# Patient Record
Sex: MALE | Race: WHITE | NOT HISPANIC OR LATINO | Employment: OTHER | URBAN - METROPOLITAN AREA
[De-identification: names, ages, dates, MRNs, and addresses within clinical notes are randomized per-mention and may not be internally consistent; named-entity substitution may affect disease eponyms.]

---

## 2024-06-18 ENCOUNTER — PRE-ADMISSION TESTING (OUTPATIENT)
Dept: PREADMISSION TESTING | Facility: HOSPITAL | Age: 59
End: 2024-06-18
Payer: COMMERCIAL

## 2024-06-18 VITALS — WEIGHT: 240.74 LBS | HEIGHT: 72 IN | BODY MASS INDEX: 32.61 KG/M2

## 2024-06-18 LAB
DEPRECATED RDW RBC AUTO: 42 FL (ref 37–54)
ERYTHROCYTE [DISTWIDTH] IN BLOOD BY AUTOMATED COUNT: 12.2 % (ref 12.3–15.4)
HCT VFR BLD AUTO: 46.1 % (ref 37.5–51)
HGB BLD-MCNC: 16.3 G/DL (ref 13–17.7)
MCH RBC QN AUTO: 33.1 PG (ref 26.6–33)
MCHC RBC AUTO-ENTMCNC: 35.4 G/DL (ref 31.5–35.7)
MCV RBC AUTO: 93.5 FL (ref 79–97)
PLATELET # BLD AUTO: 154 10*3/MM3 (ref 140–450)
PMV BLD AUTO: 10.5 FL (ref 6–12)
POTASSIUM SERPL-SCNC: 4.2 MMOL/L (ref 3.5–5.2)
RBC # BLD AUTO: 4.93 10*6/MM3 (ref 4.14–5.8)
WBC NRBC COR # BLD AUTO: 5.49 10*3/MM3 (ref 3.4–10.8)

## 2024-06-18 PROCEDURE — 93010 ELECTROCARDIOGRAM REPORT: CPT | Performed by: INTERNAL MEDICINE

## 2024-06-18 PROCEDURE — 36415 COLL VENOUS BLD VENIPUNCTURE: CPT

## 2024-06-18 PROCEDURE — 93005 ELECTROCARDIOGRAM TRACING: CPT

## 2024-06-18 PROCEDURE — 85027 COMPLETE CBC AUTOMATED: CPT

## 2024-06-18 PROCEDURE — 84132 ASSAY OF SERUM POTASSIUM: CPT

## 2024-06-18 RX ORDER — TAMSULOSIN HYDROCHLORIDE 0.4 MG/1
1 CAPSULE ORAL DAILY
COMMUNITY
Start: 2024-06-03

## 2024-06-18 RX ORDER — ASPIRIN 81 MG/1
81 TABLET ORAL DAILY
Status: ON HOLD | COMMUNITY
End: 2024-06-20

## 2024-06-18 RX ORDER — LORATADINE 10 MG/1
10 TABLET ORAL DAILY
COMMUNITY

## 2024-06-18 RX ORDER — TESTOSTERONE 20.25 MG/1.25G
GEL TOPICAL
COMMUNITY
Start: 2024-05-04

## 2024-06-18 RX ORDER — ATORVASTATIN CALCIUM 20 MG/1
20 TABLET, FILM COATED ORAL DAILY
COMMUNITY

## 2024-06-18 RX ORDER — VALSARTAN AND HYDROCHLOROTHIAZIDE 160; 12.5 MG/1; MG/1
0.5 TABLET, FILM COATED ORAL NIGHTLY
COMMUNITY
Start: 2024-05-23

## 2024-06-18 RX ORDER — FAMOTIDINE 10 MG
20 TABLET ORAL DAILY
COMMUNITY

## 2024-06-18 RX ORDER — CELECOXIB 200 MG/1
200 CAPSULE ORAL DAILY
Status: ON HOLD | COMMUNITY
End: 2024-06-20

## 2024-06-18 RX ORDER — ESZOPICLONE 3 MG/1
3 TABLET, FILM COATED ORAL
COMMUNITY
Start: 2024-06-03

## 2024-06-18 NOTE — PAT
An arrival time for procedure was not provided during PAT visit. If patient had any questions or concerns about their arrival time, they were instructed to contact their surgeon/physician.  Additionally, if the patient referred to an arrival time that was acquired from their my chart account, patient was encouraged to verify that time with their surgeon/physician. Arrival times are NOT provided in Pre Admission Testing Department.    Patient instructed to drink 20 ounces of Gatorade or Gatorlyte (if diabetic) and it needs to be completed 1 hour (for Main OR patients) or 2 hours (scheduled  section & [facility] patients) before given arrival time for procedure (NO RED Gatorade and NO Gatorade Zero).    Patient verbalized understanding.    Patient to apply Chlorhexadine wipes  to surgical area (as instructed) the night before procedure and the AM of procedure. Wipes provided.    One-on-one Pre Admission Testing general education provided during PAT visit.  Copies of PAT general education handouts (Incentive Spirometry, Meds to Beds Program, Patient Belongings, Pre-op skin preparation instructions, Blood Glucose testing, Visitor policy, Surgery FAQ, Code H) distributed to patient. Encouraged patient/family to read PAT general education handouts thoroughly and notify PAT staff with any questions or concerns. Patient instructed to bring PAT pass and completed skin prep sheet (if applicable) on the day of procedure. Patient verbalized understanding of all information and priority content.     Clearance from  in chart.    Patient reported he finished a z-swati on .  RN notified Rebeca at Dr. Calix's office.

## 2024-06-19 ENCOUNTER — HOSPITAL ENCOUNTER (OUTPATIENT)
Facility: HOSPITAL | Age: 59
Discharge: HOME | End: 2024-06-20
Attending: ORTHOPAEDIC SURGERY | Admitting: ORTHOPAEDIC SURGERY
Payer: COMMERCIAL

## 2024-06-19 ENCOUNTER — ANESTHESIA EVENT (OUTPATIENT)
Dept: PERIOP | Facility: HOSPITAL | Age: 59
End: 2024-06-19
Payer: COMMERCIAL

## 2024-06-19 ENCOUNTER — ANESTHESIA (OUTPATIENT)
Dept: PERIOP | Facility: HOSPITAL | Age: 59
End: 2024-06-19
Payer: COMMERCIAL

## 2024-06-19 ENCOUNTER — APPOINTMENT (OUTPATIENT)
Dept: GENERAL RADIOLOGY | Facility: HOSPITAL | Age: 59
End: 2024-06-19
Payer: COMMERCIAL

## 2024-06-19 DIAGNOSIS — Z98.890 S/P LUMBAR LAMINECTOMY: Primary | ICD-10-CM

## 2024-06-19 DIAGNOSIS — M54.40 LOW BACK PAIN WITH SCIATICA, SCIATICA LATERALITY UNSPECIFIED, UNSPECIFIED BACK PAIN LATERALITY, UNSPECIFIED CHRONICITY: ICD-10-CM

## 2024-06-19 PROBLEM — I10 HYPERTENSION: Status: ACTIVE | Noted: 2024-06-19

## 2024-06-19 PROBLEM — E78.5 HYPERLIPIDEMIA: Status: ACTIVE | Noted: 2024-06-19

## 2024-06-19 PROBLEM — M54.9 BACK PAIN: Status: ACTIVE | Noted: 2024-06-19

## 2024-06-19 PROCEDURE — 25010000002 DEXAMETHASONE PER 1 MG: Performed by: NURSE ANESTHETIST, CERTIFIED REGISTERED

## 2024-06-19 PROCEDURE — 25010000002 MIDAZOLAM PER 1 MG: Performed by: NURSE ANESTHETIST, CERTIFIED REGISTERED

## 2024-06-19 PROCEDURE — 72020 X-RAY EXAM OF SPINE 1 VIEW: CPT

## 2024-06-19 PROCEDURE — G0378 HOSPITAL OBSERVATION PER HR: HCPCS

## 2024-06-19 PROCEDURE — 25010000002 ONDANSETRON PER 1 MG: Performed by: ORTHOPAEDIC SURGERY

## 2024-06-19 PROCEDURE — 25010000002 CEFAZOLIN PER 500 MG: Performed by: ORTHOPAEDIC SURGERY

## 2024-06-19 PROCEDURE — 25010000002 PROPOFOL 10 MG/ML EMULSION: Performed by: NURSE ANESTHETIST, CERTIFIED REGISTERED

## 2024-06-19 PROCEDURE — 97161 PT EVAL LOW COMPLEX 20 MIN: CPT

## 2024-06-19 PROCEDURE — 25010000002 FENTANYL CITRATE (PF) 100 MCG/2ML SOLUTION: Performed by: NURSE ANESTHETIST, CERTIFIED REGISTERED

## 2024-06-19 PROCEDURE — 97116 GAIT TRAINING THERAPY: CPT

## 2024-06-19 PROCEDURE — 25010000002 PHENYLEPHRINE 10 MG/ML SOLUTION 1 ML VIAL: Performed by: NURSE ANESTHETIST, CERTIFIED REGISTERED

## 2024-06-19 PROCEDURE — 25010000002 SODIUM CHLORIDE 0.9 % WITH KCL 20 MEQ 20-0.9 MEQ/L-% SOLUTION: Performed by: ORTHOPAEDIC SURGERY

## 2024-06-19 PROCEDURE — 97110 THERAPEUTIC EXERCISES: CPT

## 2024-06-19 PROCEDURE — 25010000002 SUGAMMADEX 500 MG/5ML SOLUTION: Performed by: NURSE ANESTHETIST, CERTIFIED REGISTERED

## 2024-06-19 PROCEDURE — C1713 ANCHOR/SCREW BN/BN,TIS/BN: HCPCS | Performed by: ORTHOPAEDIC SURGERY

## 2024-06-19 PROCEDURE — S0260 H&P FOR SURGERY: HCPCS | Performed by: PHYSICIAN ASSISTANT

## 2024-06-19 PROCEDURE — 25810000003 LACTATED RINGERS PER 1000 ML: Performed by: ANESTHESIOLOGY

## 2024-06-19 DEVICE — WAX BONE HEMO AESCULAP 2.5GM: Type: IMPLANTABLE DEVICE | Site: SPINE LUMBAR | Status: FUNCTIONAL

## 2024-06-19 DEVICE — FLOSEAL WITH RECOTHROM - 10ML.
Type: IMPLANTABLE DEVICE | Site: SPINE LUMBAR | Status: FUNCTIONAL
Brand: FLOSEAL HEMOSTATIC MATRIX

## 2024-06-19 DEVICE — HEMOST ABS SURGIFOAM SZ100 8X12 10MM: Type: IMPLANTABLE DEVICE | Site: SPINE LUMBAR | Status: FUNCTIONAL

## 2024-06-19 RX ORDER — HYDROMORPHONE HYDROCHLORIDE 1 MG/ML
0.5 INJECTION, SOLUTION INTRAMUSCULAR; INTRAVENOUS; SUBCUTANEOUS
Status: DISCONTINUED | OUTPATIENT
Start: 2024-06-19 | End: 2024-06-19 | Stop reason: HOSPADM

## 2024-06-19 RX ORDER — ATORVASTATIN CALCIUM 20 MG/1
20 TABLET, FILM COATED ORAL DAILY
Status: DISCONTINUED | OUTPATIENT
Start: 2024-06-19 | End: 2024-06-20 | Stop reason: HOSPADM

## 2024-06-19 RX ORDER — VALSARTAN 160 MG/1
160 TABLET ORAL
Status: DISCONTINUED | OUTPATIENT
Start: 2024-06-19 | End: 2024-06-20 | Stop reason: HOSPADM

## 2024-06-19 RX ORDER — FAMOTIDINE 20 MG/1
20 TABLET, FILM COATED ORAL DAILY
Status: DISCONTINUED | OUTPATIENT
Start: 2024-06-20 | End: 2024-06-20 | Stop reason: HOSPADM

## 2024-06-19 RX ORDER — ACETAMINOPHEN 160 MG/5ML
650 SOLUTION ORAL EVERY 8 HOURS
Status: DISCONTINUED | OUTPATIENT
Start: 2024-06-19 | End: 2024-06-20 | Stop reason: HOSPADM

## 2024-06-19 RX ORDER — SODIUM CHLORIDE 9 MG/ML
40 INJECTION, SOLUTION INTRAVENOUS AS NEEDED
Status: DISCONTINUED | OUTPATIENT
Start: 2024-06-19 | End: 2024-06-20 | Stop reason: HOSPADM

## 2024-06-19 RX ORDER — SODIUM CHLORIDE AND POTASSIUM CHLORIDE 150; 900 MG/100ML; MG/100ML
100 INJECTION, SOLUTION INTRAVENOUS CONTINUOUS
Status: DISCONTINUED | OUTPATIENT
Start: 2024-06-19 | End: 2024-06-20 | Stop reason: HOSPADM

## 2024-06-19 RX ORDER — AMOXICILLIN 250 MG
2 CAPSULE ORAL 2 TIMES DAILY PRN
Status: DISCONTINUED | OUTPATIENT
Start: 2024-06-19 | End: 2024-06-20 | Stop reason: HOSPADM

## 2024-06-19 RX ORDER — SODIUM CHLORIDE 0.9 % (FLUSH) 0.9 %
3 SYRINGE (ML) INJECTION EVERY 12 HOURS SCHEDULED
Status: DISCONTINUED | OUTPATIENT
Start: 2024-06-19 | End: 2024-06-19 | Stop reason: HOSPADM

## 2024-06-19 RX ORDER — MIDAZOLAM HYDROCHLORIDE 1 MG/ML
INJECTION INTRAMUSCULAR; INTRAVENOUS AS NEEDED
Status: DISCONTINUED | OUTPATIENT
Start: 2024-06-19 | End: 2024-06-19 | Stop reason: SURG

## 2024-06-19 RX ORDER — NALOXONE HCL 0.4 MG/ML
0.4 VIAL (ML) INJECTION
Status: DISCONTINUED | OUTPATIENT
Start: 2024-06-19 | End: 2024-06-20 | Stop reason: HOSPADM

## 2024-06-19 RX ORDER — SODIUM CHLORIDE 9 MG/ML
40 INJECTION, SOLUTION INTRAVENOUS AS NEEDED
Status: DISCONTINUED | OUTPATIENT
Start: 2024-06-19 | End: 2024-06-19 | Stop reason: HOSPADM

## 2024-06-19 RX ORDER — PREGABALIN 75 MG/1
75 CAPSULE ORAL ONCE
Status: COMPLETED | OUTPATIENT
Start: 2024-06-19 | End: 2024-06-19

## 2024-06-19 RX ORDER — PROMETHAZINE HYDROCHLORIDE 25 MG/1
25 TABLET ORAL ONCE AS NEEDED
Status: DISCONTINUED | OUTPATIENT
Start: 2024-06-19 | End: 2024-06-19 | Stop reason: HOSPADM

## 2024-06-19 RX ORDER — DROPERIDOL 2.5 MG/ML
0.62 INJECTION, SOLUTION INTRAMUSCULAR; INTRAVENOUS
Status: DISCONTINUED | OUTPATIENT
Start: 2024-06-19 | End: 2024-06-19 | Stop reason: HOSPADM

## 2024-06-19 RX ORDER — FAMOTIDINE 20 MG/1
20 TABLET, FILM COATED ORAL ONCE
Status: COMPLETED | OUTPATIENT
Start: 2024-06-19 | End: 2024-06-19

## 2024-06-19 RX ORDER — MEPERIDINE HYDROCHLORIDE 25 MG/ML
12.5 INJECTION INTRAMUSCULAR; INTRAVENOUS; SUBCUTANEOUS
Status: DISCONTINUED | OUTPATIENT
Start: 2024-06-19 | End: 2024-06-19 | Stop reason: HOSPADM

## 2024-06-19 RX ORDER — LABETALOL HYDROCHLORIDE 5 MG/ML
10 INJECTION, SOLUTION INTRAVENOUS EVERY 4 HOURS PRN
Status: DISCONTINUED | OUTPATIENT
Start: 2024-06-19 | End: 2024-06-20 | Stop reason: HOSPADM

## 2024-06-19 RX ORDER — IPRATROPIUM BROMIDE AND ALBUTEROL SULFATE 2.5; .5 MG/3ML; MG/3ML
3 SOLUTION RESPIRATORY (INHALATION) ONCE AS NEEDED
Status: DISCONTINUED | OUTPATIENT
Start: 2024-06-19 | End: 2024-06-19 | Stop reason: HOSPADM

## 2024-06-19 RX ORDER — SODIUM CHLORIDE, SODIUM LACTATE, POTASSIUM CHLORIDE, CALCIUM CHLORIDE 600; 310; 30; 20 MG/100ML; MG/100ML; MG/100ML; MG/100ML
9 INJECTION, SOLUTION INTRAVENOUS CONTINUOUS
Status: DISCONTINUED | OUTPATIENT
Start: 2024-06-19 | End: 2024-06-20 | Stop reason: HOSPADM

## 2024-06-19 RX ORDER — ACETAMINOPHEN 325 MG/1
650 TABLET ORAL EVERY 8 HOURS
Status: DISCONTINUED | OUTPATIENT
Start: 2024-06-19 | End: 2024-06-20 | Stop reason: HOSPADM

## 2024-06-19 RX ORDER — DROPERIDOL 2.5 MG/ML
0.62 INJECTION, SOLUTION INTRAMUSCULAR; INTRAVENOUS ONCE AS NEEDED
Status: DISCONTINUED | OUTPATIENT
Start: 2024-06-19 | End: 2024-06-19 | Stop reason: HOSPADM

## 2024-06-19 RX ORDER — SODIUM CHLORIDE 0.9 % (FLUSH) 0.9 %
10 SYRINGE (ML) INJECTION EVERY 12 HOURS SCHEDULED
Status: DISCONTINUED | OUTPATIENT
Start: 2024-06-19 | End: 2024-06-19 | Stop reason: HOSPADM

## 2024-06-19 RX ORDER — FENTANYL CITRATE 50 UG/ML
INJECTION, SOLUTION INTRAMUSCULAR; INTRAVENOUS AS NEEDED
Status: DISCONTINUED | OUTPATIENT
Start: 2024-06-19 | End: 2024-06-19 | Stop reason: SURG

## 2024-06-19 RX ORDER — EPHEDRINE SULFATE 50 MG/ML
INJECTION INTRAVENOUS AS NEEDED
Status: DISCONTINUED | OUTPATIENT
Start: 2024-06-19 | End: 2024-06-19 | Stop reason: SURG

## 2024-06-19 RX ORDER — SODIUM CHLORIDE 0.9 % (FLUSH) 0.9 %
3-10 SYRINGE (ML) INJECTION AS NEEDED
Status: DISCONTINUED | OUTPATIENT
Start: 2024-06-19 | End: 2024-06-20 | Stop reason: HOSPADM

## 2024-06-19 RX ORDER — MIDAZOLAM HYDROCHLORIDE 1 MG/ML
1 INJECTION INTRAMUSCULAR; INTRAVENOUS
Status: DISCONTINUED | OUTPATIENT
Start: 2024-06-19 | End: 2024-06-19 | Stop reason: HOSPADM

## 2024-06-19 RX ORDER — ACETAMINOPHEN 500 MG
1000 TABLET ORAL ONCE
Status: COMPLETED | OUTPATIENT
Start: 2024-06-19 | End: 2024-06-19

## 2024-06-19 RX ORDER — POLYETHYLENE GLYCOL 3350 17 G/17G
17 POWDER, FOR SOLUTION ORAL DAILY PRN
Status: DISCONTINUED | OUTPATIENT
Start: 2024-06-19 | End: 2024-06-20 | Stop reason: HOSPADM

## 2024-06-19 RX ORDER — ONDANSETRON 2 MG/ML
4 INJECTION INTRAMUSCULAR; INTRAVENOUS ONCE AS NEEDED
Status: DISCONTINUED | OUTPATIENT
Start: 2024-06-19 | End: 2024-06-19 | Stop reason: HOSPADM

## 2024-06-19 RX ORDER — LABETALOL HYDROCHLORIDE 5 MG/ML
5 INJECTION, SOLUTION INTRAVENOUS
Status: DISCONTINUED | OUTPATIENT
Start: 2024-06-19 | End: 2024-06-19 | Stop reason: HOSPADM

## 2024-06-19 RX ORDER — ONDANSETRON 2 MG/ML
4 INJECTION INTRAMUSCULAR; INTRAVENOUS EVERY 6 HOURS PRN
Status: DISCONTINUED | OUTPATIENT
Start: 2024-06-19 | End: 2024-06-20

## 2024-06-19 RX ORDER — SODIUM CHLORIDE 0.9 % (FLUSH) 0.9 %
3 SYRINGE (ML) INJECTION EVERY 12 HOURS SCHEDULED
Status: DISCONTINUED | OUTPATIENT
Start: 2024-06-19 | End: 2024-06-20 | Stop reason: HOSPADM

## 2024-06-19 RX ORDER — LIDOCAINE HYDROCHLORIDE 10 MG/ML
INJECTION, SOLUTION EPIDURAL; INFILTRATION; INTRACAUDAL; PERINEURAL AS NEEDED
Status: DISCONTINUED | OUTPATIENT
Start: 2024-06-19 | End: 2024-06-19 | Stop reason: SURG

## 2024-06-19 RX ORDER — DEXAMETHASONE SODIUM PHOSPHATE 4 MG/ML
INJECTION, SOLUTION INTRA-ARTICULAR; INTRALESIONAL; INTRAMUSCULAR; INTRAVENOUS; SOFT TISSUE AS NEEDED
Status: DISCONTINUED | OUTPATIENT
Start: 2024-06-19 | End: 2024-06-19 | Stop reason: SURG

## 2024-06-19 RX ORDER — NALOXONE HCL 0.4 MG/ML
0.4 VIAL (ML) INJECTION AS NEEDED
Status: DISCONTINUED | OUTPATIENT
Start: 2024-06-19 | End: 2024-06-19 | Stop reason: HOSPADM

## 2024-06-19 RX ORDER — OXYCODONE HCL 10 MG/1
10 TABLET, FILM COATED, EXTENDED RELEASE ORAL ONCE
Status: COMPLETED | OUTPATIENT
Start: 2024-06-19 | End: 2024-06-19

## 2024-06-19 RX ORDER — FENTANYL CITRATE 50 UG/ML
50 INJECTION, SOLUTION INTRAMUSCULAR; INTRAVENOUS
Status: DISCONTINUED | OUTPATIENT
Start: 2024-06-19 | End: 2024-06-19 | Stop reason: HOSPADM

## 2024-06-19 RX ORDER — FAMOTIDINE 10 MG/ML
20 INJECTION, SOLUTION INTRAVENOUS ONCE
Status: CANCELLED | OUTPATIENT
Start: 2024-06-19 | End: 2024-06-19

## 2024-06-19 RX ORDER — TAMSULOSIN HYDROCHLORIDE 0.4 MG/1
0.4 CAPSULE ORAL DAILY
Status: DISCONTINUED | OUTPATIENT
Start: 2024-06-19 | End: 2024-06-20 | Stop reason: HOSPADM

## 2024-06-19 RX ORDER — MORPHINE SULFATE 2 MG/ML
2 INJECTION, SOLUTION INTRAMUSCULAR; INTRAVENOUS
Status: DISCONTINUED | OUTPATIENT
Start: 2024-06-19 | End: 2024-06-20 | Stop reason: HOSPADM

## 2024-06-19 RX ORDER — PROMETHAZINE HYDROCHLORIDE 25 MG/1
25 SUPPOSITORY RECTAL ONCE AS NEEDED
Status: DISCONTINUED | OUTPATIENT
Start: 2024-06-19 | End: 2024-06-19 | Stop reason: HOSPADM

## 2024-06-19 RX ORDER — HYDROCODONE BITARTRATE AND ACETAMINOPHEN 7.5; 325 MG/1; MG/1
1 TABLET ORAL EVERY 4 HOURS PRN
Status: DISCONTINUED | OUTPATIENT
Start: 2024-06-19 | End: 2024-06-20 | Stop reason: HOSPADM

## 2024-06-19 RX ORDER — PROPOFOL 10 MG/ML
VIAL (ML) INTRAVENOUS AS NEEDED
Status: DISCONTINUED | OUTPATIENT
Start: 2024-06-19 | End: 2024-06-19 | Stop reason: SURG

## 2024-06-19 RX ORDER — SODIUM CHLORIDE 0.9 % (FLUSH) 0.9 %
10 SYRINGE (ML) INJECTION AS NEEDED
Status: DISCONTINUED | OUTPATIENT
Start: 2024-06-19 | End: 2024-06-19 | Stop reason: HOSPADM

## 2024-06-19 RX ORDER — ONDANSETRON 2 MG/ML
4 INJECTION INTRAMUSCULAR; INTRAVENOUS EVERY 6 HOURS PRN
Status: DISCONTINUED | OUTPATIENT
Start: 2024-06-19 | End: 2024-06-19 | Stop reason: SDUPTHER

## 2024-06-19 RX ORDER — SODIUM CHLORIDE 0.9 % (FLUSH) 0.9 %
3-10 SYRINGE (ML) INJECTION AS NEEDED
Status: DISCONTINUED | OUTPATIENT
Start: 2024-06-19 | End: 2024-06-19 | Stop reason: HOSPADM

## 2024-06-19 RX ORDER — HYDRALAZINE HYDROCHLORIDE 20 MG/ML
5 INJECTION INTRAMUSCULAR; INTRAVENOUS
Status: DISCONTINUED | OUTPATIENT
Start: 2024-06-19 | End: 2024-06-19 | Stop reason: HOSPADM

## 2024-06-19 RX ORDER — LIDOCAINE HYDROCHLORIDE 10 MG/ML
0.5 INJECTION, SOLUTION EPIDURAL; INFILTRATION; INTRACAUDAL; PERINEURAL ONCE AS NEEDED
Status: COMPLETED | OUTPATIENT
Start: 2024-06-19 | End: 2024-06-19

## 2024-06-19 RX ORDER — PHENYLEPHRINE HCL IN 0.9% NACL 1 MG/10 ML
SYRINGE (ML) INTRAVENOUS AS NEEDED
Status: DISCONTINUED | OUTPATIENT
Start: 2024-06-19 | End: 2024-06-19 | Stop reason: SURG

## 2024-06-19 RX ORDER — BISACODYL 5 MG/1
5 TABLET, DELAYED RELEASE ORAL DAILY PRN
Status: DISCONTINUED | OUTPATIENT
Start: 2024-06-19 | End: 2024-06-20 | Stop reason: HOSPADM

## 2024-06-19 RX ORDER — HYDROCODONE BITARTRATE AND ACETAMINOPHEN 5; 325 MG/1; MG/1
1 TABLET ORAL ONCE AS NEEDED
Status: DISCONTINUED | OUTPATIENT
Start: 2024-06-19 | End: 2024-06-19 | Stop reason: HOSPADM

## 2024-06-19 RX ORDER — MAGNESIUM HYDROXIDE 1200 MG/15ML
LIQUID ORAL AS NEEDED
Status: DISCONTINUED | OUTPATIENT
Start: 2024-06-19 | End: 2024-06-19 | Stop reason: HOSPADM

## 2024-06-19 RX ORDER — METHOCARBAMOL 500 MG/1
1000 TABLET, FILM COATED ORAL 4 TIMES DAILY PRN
Status: DISCONTINUED | OUTPATIENT
Start: 2024-06-19 | End: 2024-06-20 | Stop reason: HOSPADM

## 2024-06-19 RX ORDER — ACETAMINOPHEN 650 MG/1
650 SUPPOSITORY RECTAL EVERY 8 HOURS
Status: DISCONTINUED | OUTPATIENT
Start: 2024-06-19 | End: 2024-06-20 | Stop reason: HOSPADM

## 2024-06-19 RX ORDER — CYCLOBENZAPRINE HCL 10 MG
10 TABLET ORAL 3 TIMES DAILY PRN
Status: DISCONTINUED | OUTPATIENT
Start: 2024-06-19 | End: 2024-06-20 | Stop reason: HOSPADM

## 2024-06-19 RX ORDER — BUPIVACAINE HYDROCHLORIDE AND EPINEPHRINE 2.5; 5 MG/ML; UG/ML
INJECTION, SOLUTION EPIDURAL; INFILTRATION; INTRACAUDAL; PERINEURAL AS NEEDED
Status: DISCONTINUED | OUTPATIENT
Start: 2024-06-19 | End: 2024-06-19 | Stop reason: HOSPADM

## 2024-06-19 RX ORDER — ROCURONIUM BROMIDE 10 MG/ML
INJECTION, SOLUTION INTRAVENOUS AS NEEDED
Status: DISCONTINUED | OUTPATIENT
Start: 2024-06-19 | End: 2024-06-19 | Stop reason: SURG

## 2024-06-19 RX ORDER — MORPHINE SULFATE 2 MG/ML
1 INJECTION, SOLUTION INTRAMUSCULAR; INTRAVENOUS
Status: DISCONTINUED | OUTPATIENT
Start: 2024-06-19 | End: 2024-06-20 | Stop reason: HOSPADM

## 2024-06-19 RX ORDER — BISACODYL 10 MG
10 SUPPOSITORY, RECTAL RECTAL DAILY PRN
Status: DISCONTINUED | OUTPATIENT
Start: 2024-06-19 | End: 2024-06-20 | Stop reason: HOSPADM

## 2024-06-19 RX ADMIN — Medication 100 MCG: at 11:32

## 2024-06-19 RX ADMIN — ROCURONIUM BROMIDE 80 MG: 10 INJECTION INTRAVENOUS at 10:46

## 2024-06-19 RX ADMIN — SUGAMMADEX 300 MG: 100 INJECTION, SOLUTION INTRAVENOUS at 12:51

## 2024-06-19 RX ADMIN — MIDAZOLAM HYDROCHLORIDE 2 MG: 1 INJECTION, SOLUTION INTRAMUSCULAR; INTRAVENOUS at 10:35

## 2024-06-19 RX ADMIN — Medication 100 MCG: at 11:29

## 2024-06-19 RX ADMIN — EPHEDRINE SULFATE 5 MG: 50 INJECTION INTRAVENOUS at 11:46

## 2024-06-19 RX ADMIN — LIDOCAINE HYDROCHLORIDE 0.5 ML: 10 INJECTION, SOLUTION EPIDURAL; INFILTRATION; INTRACAUDAL; PERINEURAL at 08:18

## 2024-06-19 RX ADMIN — SODIUM CHLORIDE, POTASSIUM CHLORIDE, SODIUM LACTATE AND CALCIUM CHLORIDE 9 ML/HR: 600; 310; 30; 20 INJECTION, SOLUTION INTRAVENOUS at 08:18

## 2024-06-19 RX ADMIN — DEXAMETHASONE SODIUM PHOSPHATE 8 MG: 4 INJECTION INTRA-ARTICULAR; INTRALESIONAL; INTRAMUSCULAR; INTRAVENOUS; SOFT TISSUE at 11:20

## 2024-06-19 RX ADMIN — PROPOFOL 250 MG: 10 INJECTION, EMULSION INTRAVENOUS at 10:45

## 2024-06-19 RX ADMIN — Medication 100 MCG: at 11:46

## 2024-06-19 RX ADMIN — HYDROCODONE BITARTRATE AND ACETAMINOPHEN 1 TABLET: 7.5; 325 TABLET ORAL at 20:38

## 2024-06-19 RX ADMIN — ROCURONIUM BROMIDE 10 MG: 10 INJECTION INTRAVENOUS at 11:35

## 2024-06-19 RX ADMIN — OXYCODONE HYDROCHLORIDE 10 MG: 10 TABLET, FILM COATED, EXTENDED RELEASE ORAL at 08:31

## 2024-06-19 RX ADMIN — METHOCARBAMOL 1000 MG: 500 TABLET ORAL at 15:04

## 2024-06-19 RX ADMIN — PHENYLEPHRINE HYDROCHLORIDE 0.25 MCG/KG/MIN: 10 INJECTION INTRAVENOUS at 12:02

## 2024-06-19 RX ADMIN — Medication 100 MCG: at 11:44

## 2024-06-19 RX ADMIN — FAMOTIDINE 20 MG: 20 TABLET, FILM COATED ORAL at 08:31

## 2024-06-19 RX ADMIN — LIDOCAINE HYDROCHLORIDE 100 MG: 10 INJECTION, SOLUTION EPIDURAL; INFILTRATION; INTRACAUDAL; PERINEURAL at 10:44

## 2024-06-19 RX ADMIN — SODIUM CHLORIDE 2 G: 900 INJECTION INTRAVENOUS at 10:35

## 2024-06-19 RX ADMIN — Medication 100 MCG: at 11:50

## 2024-06-19 RX ADMIN — ACETAMINOPHEN 650 MG: 325 TABLET ORAL at 15:03

## 2024-06-19 RX ADMIN — ACETAMINOPHEN 1000 MG: 500 TABLET ORAL at 08:31

## 2024-06-19 RX ADMIN — FENTANYL CITRATE 50 MCG: 50 INJECTION, SOLUTION INTRAMUSCULAR; INTRAVENOUS at 13:21

## 2024-06-19 RX ADMIN — ONDANSETRON 4 MG: 2 INJECTION INTRAMUSCULAR; INTRAVENOUS at 16:51

## 2024-06-19 RX ADMIN — SODIUM CHLORIDE 2000 MG: 900 INJECTION INTRAVENOUS at 16:51

## 2024-06-19 RX ADMIN — Medication 150 MCG: at 11:57

## 2024-06-19 RX ADMIN — POTASSIUM CHLORIDE AND SODIUM CHLORIDE 100 ML/HR: 900; 150 INJECTION, SOLUTION INTRAVENOUS at 15:04

## 2024-06-19 RX ADMIN — VALSARTAN 80 MG: 160 TABLET, FILM COATED ORAL at 15:03

## 2024-06-19 RX ADMIN — FENTANYL CITRATE 100 MCG: 50 INJECTION, SOLUTION INTRAMUSCULAR; INTRAVENOUS at 10:42

## 2024-06-19 RX ADMIN — FENTANYL CITRATE 50 MCG: 50 INJECTION, SOLUTION INTRAMUSCULAR; INTRAVENOUS at 13:01

## 2024-06-19 RX ADMIN — Medication 3 ML: at 20:38

## 2024-06-19 RX ADMIN — PREGABALIN 75 MG: 75 CAPSULE ORAL at 08:31

## 2024-06-19 RX ADMIN — TAMSULOSIN HYDROCHLORIDE 0.4 MG: 0.4 CAPSULE ORAL at 15:03

## 2024-06-19 RX ADMIN — ROCURONIUM BROMIDE 10 MG: 10 INJECTION INTRAVENOUS at 11:49

## 2024-06-19 RX ADMIN — ATORVASTATIN CALCIUM 20 MG: 20 TABLET, FILM COATED ORAL at 15:03

## 2024-06-19 RX ADMIN — HYDROCODONE BITARTRATE AND ACETAMINOPHEN 1 TABLET: 7.5; 325 TABLET ORAL at 15:03

## 2024-06-19 NOTE — PLAN OF CARE
Goal Outcome Evaluation:              Outcome Evaluation: (P) Pt able to ambulate 350' with FWW and CGA. Pt with no radicular symptoms in BLE throughout session. Pt presents below baseline with deficits in strength, balance, activity tolerance, and pain. IPPT would be appropriate during admission to address functional goals. PT recommended DC to home with assist when medicaly appropriate.      Anticipated Discharge Disposition (PT): (P) home with assist

## 2024-06-19 NOTE — H&P
Patient Name: Bravo Rogers  MRN: [id number]  : 1965  DOS: 2024    Attending: Grant Calix MD    Primary Care Provider: Yoni Brunson MD      Chief complaint:  Back pain    Subjective   Patient is a pleasant 59 y.o. male presented for scheduled surgery by Dr. Calix.  He underwent lumbar laminectomy under general anesthesia.  He tolerated surgery well and was admitted for further medical management.  He has had worsening back pain over the last couple years.  The pain radiated down bilateral lower extremities, R>L.  He denies saddle anesthesia.    When seen postop he is doing well.  His pain is well-controlled.  He denies nausea, shortness of breath or chest pain.  No history of DVT or PE.    Allergies:  No Known Allergies    Meds:  Medications Prior to Admission   Medication Sig Dispense Refill Last Dose    aspirin 81 MG EC tablet Take 1 tablet by mouth Daily.   Past Month    atorvastatin (LIPITOR) 20 MG tablet Take 1 tablet by mouth Daily.   2024    celecoxib (CeleBREX) 200 MG capsule Take 1 capsule by mouth Daily.   2024    eszopiclone (LUNESTA) 3 MG tablet Take 1 tablet by mouth every night at bedtime.   2024    famotidine (PEPCID) 10 MG tablet Take 2 tablets by mouth Daily.   2024    loratadine (CLARITIN) 10 MG tablet Take 1 tablet by mouth Daily.   2024    tamsulosin (FLOMAX) 0.4 MG capsule 24 hr capsule Take 1 capsule by mouth Daily.   2024    Testosterone 1.62 % gel APPLY 1 PUMP TO EACH ARM EVERY DAY   2024    valsartan-hydrochlorothiazide (DIOVAN-HCT) 160-12.5 MG per tablet Take 0.5 tablets by mouth Every Night.   2024         History:   Past Medical History:   Diagnosis Date    Elevated cholesterol     GERD (gastroesophageal reflux disease)     Hypertension     Seasonal allergies     Spinal stenosis      Past Surgical History:   Procedure Laterality Date    COLONOSCOPY      CYSTOSCOPY      lithotripsy, stent for kidney stone    FINGER / TOE CROSS  "FLAP      VASECTOMY      WISDOM TOOTH EXTRACTION       History reviewed. No pertinent family history.  Social History     Tobacco Use    Smoking status: Never    Smokeless tobacco: Never   Vaping Use    Vaping status: Never Used   Substance Use Topics    Alcohol use: Never    Drug use: Never   He is  with 2 children.  He is a coalminer.    Review of Systems  Pertinent items are noted in HPI, all other systems reviewed and negative    Vital Signs  /77   Pulse 71   Temp 97.4 °F (36.3 °C) (Oral)   Resp 18   Ht 182.9 cm (72\")   Wt 109 kg (240 lb)   SpO2 93%   BMI 32.55 kg/m²     Physical Exam:    General Appearance:    Alert, cooperative, in no acute distress   Head:    Normocephalic, without obvious abnormality, atraumatic   Eyes:            Lids and lashes normal, conjunctivae and sclerae normal, no   icterus, no pallor, corneas clear,    Ears:    Ears appear intact with no abnormalities noted   Throat:   No oral lesions, no thrush, oral mucosa moist   Back: Surgical dressing CDI.  Hemovac present   Lungs:     Clear to auscultation,respirations regular, even and unlabored    Heart:    Regular rhythm and normal rate, normal S1 and S2   Abdomen:     Normal bowel sounds, no masses, no organomegaly, soft non-tender, non-distended, no guarding, no rebound  tenderness   Genitalia:    Deferred   Extremities:   Moves all extremities well, no edema, no cyanosis, no  redness   Pulses:   Pulses palpable and equal bilaterally   Skin:   No bleeding, bruising or rash   Neurologic:   Cranial nerves 2 - 12 grossly intact. Flexion and dorsiflexion intact bilateral feet.        I reviewed the patient's new clinical results.       Results from last 7 days   Lab Units 06/18/24  1355   WBC 10*3/mm3 5.49   HEMOGLOBIN g/dL 16.3   HEMATOCRIT % 46.1   PLATELETS 10*3/mm3 154     Results from last 7 days   Lab Units 06/18/24  1355   POTASSIUM mmol/L 4.2     No results found for: \"HGBA1C\"      Assessment and Plan:     S/P " lumbar laminectomy    Back pain    Hyperlipidemia    Hypertension      Plan  1. PT-ambulate  2. Pain control-prns   3. IS-encourage  4. DVT proph- [facility]  5. Bowel regimen  6. Resume home medications as appropriate  7. Monitor post-op labs  8. DC planning for home    HTN, Hyperlipidemia  - Continue home Diovan and statin  - Hold HCTZ for now  - Monitor BP   - Holding parameters for BP meds  - Labetalol PRN for SBP>170      Sofya Rodríguez, JAMEY  06/19/24  15:22 EDT

## 2024-06-19 NOTE — H&P
[facility] PREOPERATIVE HISTORY AND PHYSICAL       Chief complaint:  LOWER BACK PAIN    Subjective:    Patient is a 59 y.o.male presents with history of progressive worsening of chronic lower back pain.  He describes pain as a dull to sharp pain to lower back with occasional pain radiating down legs (R>L) and intermittent tingling of upper thighs/legs (R>L).  He denies any bowel or bladder complaints or saddle anesthesia. Imaging of lumbar spine showed lumbar spondylosis. Conservative measures have been tried for 3 months or longer, but have failed to adequately treat or improve the patient's symptoms. Pain is restricting the patient's daily activities as well as quality of life. The patient is here today for scheduled and consented LUMBAR LAMINECTOMY L3-4, L4-5 by Dr. Calix.       Review of Systems:  General ROS: negative for fever, chills, weakness, dizziness, headache, fatigue, weight changes  Cardiovascular ROS: no chest pain or dyspnea on exertion  Respiratory ROS: no cough, shortness of breath, or wheezing  GI ROS: no abdominal pain/discomfort, nausea, vomiting or diarrhea   ROS: no dysuria, hematuria or complaints  Skin ROS: no itching, rash or open wounds.        Allergies: No Known Allergies  Latex: no known allergy  Contrast Dye:  no known allergy      Home Meds    Medications Prior to Admission   Medication Sig Dispense Refill Last Dose    aspirin 81 MG EC tablet Take 1 tablet by mouth Daily.   Past Month    atorvastatin (LIPITOR) 20 MG tablet Take 1 tablet by mouth Daily.   6/18/2024    celecoxib (CeleBREX) 200 MG capsule Take 1 capsule by mouth Daily.   6/18/2024    eszopiclone (LUNESTA) 3 MG tablet Take 1 tablet by mouth every night at bedtime.   6/18/2024    famotidine (PEPCID) 10 MG tablet Take 2 tablets by mouth Daily.   6/18/2024    loratadine (CLARITIN) 10 MG tablet Take 1 tablet by mouth Daily.   6/18/2024    tamsulosin (FLOMAX) 0.4 MG capsule 24 hr capsule Take 1 capsule by  "mouth Daily.   6/18/2024    Testosterone 1.62 % gel APPLY 1 PUMP TO EACH ARM EVERY DAY   6/18/2024    valsartan-hydrochlorothiazide (DIOVAN-HCT) 160-12.5 MG per tablet Take 0.5 tablets by mouth Every Night.   6/18/2024     PMH:   Past Medical History:   Diagnosis Date    Elevated cholesterol     GERD (gastroesophageal reflux disease)     Hypertension     Seasonal allergies     Spinal stenosis      PSH:    Past Surgical History:   Procedure Laterality Date    COLONOSCOPY      CYSTOSCOPY      lithotripsy, stent for kidney stone    FINGER / TOE CROSS FLAP      VASECTOMY      WISDOM TOOTH EXTRACTION       Immunization History:   Immunization History   Administered Date(s) Administered    COVID-19 (PFIZER) Purple Cap Monovalent 02/26/2021, 03/19/2021, 10/29/2021    Hepatitis A 02/24/2019    flucelvax quad pfs =>4 YRS 02/24/2019       Social History:  Social History     Tobacco Use    Smoking status: Never    Smokeless tobacco: Never   Substance Use Topics    Alcohol use: Never           Physical Exam:/92 (BP Location: Right arm, Patient Position: Lying)   Pulse 72   Temp 98.1 °F (36.7 °C) (Temporal)   Resp 18   Ht 182.9 cm (72\")   Wt 109 kg (240 lb)   SpO2 98%   BMI 32.55 kg/m²       General Appearance:    Alert, cooperative, no distress, appears stated age   Head:    Normocephalic, without obvious abnormality, atraumatic   Lungs:     Clear to auscultation bilaterally, respirations unlabored    Heart: Regular rate and rhythm, S1 and S2 normal, no murmur, rub    or gallop    Abdomen:    Soft without tenderness  +bowel sounds   Breast Exam:    deferred   Genitalia:    deferred   Extremities:   Extremities normal, atraumatic, no cyanosis or edema   Skin:   Skin color, texture, turgor normal, no rashes or lesions   Neurologic:   Grossly intact     Results Review:   LABS:  Lab Results   Component Value Date    WBC 5.49 06/18/2024    HGB 16.3 06/18/2024    HCT 46.1 06/18/2024    MCV 93.5 06/18/2024     " 06/18/2024    K 4.2 06/18/2024       RADIOLOGY:  Imaging Results (Last 72 Hours)       ** No results found for the last 72 hours. **              Cancer Staging (if applicable):  Cancer Patient: __ yes __no __unknown; If yes, clinical stage T:__ N:__M:__, stage group    Impression:  LOWER BACK PAIN; LUMBAR SPONDYLOSIS      Plan:  LUMBAR LAMINECTOMY L3-4, L4-5       FRANCO Ponce 6/19/2024 09:00 EDT

## 2024-06-19 NOTE — PLAN OF CARE
Problem: Adult Inpatient Plan of Care  Goal: Plan of Care Review  Outcome: Ongoing, Progressing  Flowsheets (Taken 6/19/2024 1817)  Progress: no change  Plan of Care Reviewed With: patient  Goal: Patient-Specific Goal (Individualized)  Outcome: Ongoing, Progressing  Goal: Absence of Hospital-Acquired Illness or Injury  Outcome: Ongoing, Progressing  Intervention: Identify and Manage Fall Risk  Recent Flowsheet Documentation  Taken 6/19/2024 1800 by Rochelle Vasquez RN  Safety Promotion/Fall Prevention:   activity supervised   assistive device/personal items within reach   clutter free environment maintained   safety round/check completed   room organization consistent   toileting scheduled  Taken 6/19/2024 1600 by Rochelle Vasquez RN  Safety Promotion/Fall Prevention:   activity supervised   assistive device/personal items within reach   clutter free environment maintained   room organization consistent   safety round/check completed   toileting scheduled  Taken 6/19/2024 1446 by Rochelle Vasquez RN  Safety Promotion/Fall Prevention:   activity supervised   assistive device/personal items within reach   clutter free environment maintained   room organization consistent   safety round/check completed   toileting scheduled  Intervention: Prevent Skin Injury  Recent Flowsheet Documentation  Taken 6/19/2024 1800 by Rochelle Vasquez RN  Body Position: legs elevated  Taken 6/19/2024 1600 by Rochelle Vasquez RN  Body Position: sitting up in bed  Taken 6/19/2024 1446 by Rochelle Vasquez RN  Body Position: sitting up in bed  Intervention: Prevent and Manage VTE (Venous Thromboembolism) Risk  Recent Flowsheet Documentation  Taken 6/19/2024 1800 by Rochelle Vasquez RN  Activity Management: activity encouraged  VTE Prevention/Management:   bilateral   sequential compression devices on  Taken 6/19/2024 1600 by Rochelle Vasquez RN  Activity Management: activity encouraged  VTE Prevention/Management:   bilateral   sequential compression  devices on  Taken 6/19/2024 1446 by Rochelle Vasquez RN  Activity Management: activity encouraged  VTE Prevention/Management:   bilateral   sequential compression devices on  Intervention: Prevent Infection  Recent Flowsheet Documentation  Taken 6/19/2024 1800 by Rochelle Vasquez RN  Infection Prevention:   environmental surveillance performed   rest/sleep promoted  Taken 6/19/2024 1600 by Rochelle Vasquez RN  Infection Prevention:   environmental surveillance performed   rest/sleep promoted  Taken 6/19/2024 1446 by Rochelle Vasquez RN  Infection Prevention:   environmental surveillance performed   rest/sleep promoted  Goal: Optimal Comfort and Wellbeing  Outcome: Ongoing, Progressing  Intervention: Provide Person-Centered Care  Recent Flowsheet Documentation  Taken 6/19/2024 1600 by Rochelle Vasquez RN  Trust Relationship/Rapport: care explained  Taken 6/19/2024 1446 by Rochelle Vasquez RN  Trust Relationship/Rapport: care explained  Goal: Readiness for Transition of Care  Outcome: Ongoing, Progressing  Intervention: Mutually Develop Transition Plan  Recent Flowsheet Documentation  Taken 6/19/2024 1446 by Rochelle Vasquez RN  Transportation Anticipated: family or friend will provide  Transportation Concerns: none  Patient/Family Anticipated Services at Transition:   Patient/Family Anticipates Transition to: home with family     Problem: Hypertension Comorbidity  Goal: Blood Pressure in Desired Range  Outcome: Ongoing, Progressing  Intervention: Maintain Blood Pressure Management  Recent Flowsheet Documentation  Taken 6/19/2024 1800 by Rochelle Vasquez RN  Medication Review/Management: medications reviewed  Taken 6/19/2024 1600 by Rochelle Vasquez RN  Medication Review/Management: medications reviewed  Taken 6/19/2024 1446 by Rochelle Vasquez RN  Medication Review/Management: medications reviewed     Problem: Bleeding (Spinal Surgery)  Goal: Absence of Bleeding  Outcome: Ongoing, Progressing     Problem: Bowel  Motility Impaired (Spinal Surgery)  Goal: Effective Bowel Elimination  Outcome: Ongoing, Progressing     Problem: Fluid and Electrolyte Imbalance (Spinal Surgery)  Goal: Fluid and Electrolyte Balance  Outcome: Ongoing, Progressing     Problem: Functional Ability Impaired (Spinal Surgery)  Goal: Optimal Functional Ability  Outcome: Ongoing, Progressing  Intervention: Optimize Functional Status  Recent Flowsheet Documentation  Taken 6/19/2024 1800 by Rochelle Vasquez RN  Activity Management: activity encouraged  Positioning/Transfer Devices:   pillows   in use  Taken 6/19/2024 1600 by Rochelle Vasquez RN  Activity Management: activity encouraged  Positioning/Transfer Devices:   pillows   in use  Taken 6/19/2024 1446 by Rochelle Vasquez RN  Activity Management: activity encouraged  Positioning/Transfer Devices:   pillows   in use     Problem: Infection (Spinal Surgery)  Goal: Absence of Infection Signs and Symptoms  Outcome: Ongoing, Progressing  Intervention: Prevent or Manage Infection  Recent Flowsheet Documentation  Taken 6/19/2024 1800 by Rochelle Vasquez RN  Infection Prevention:   environmental surveillance performed   rest/sleep promoted  Taken 6/19/2024 1600 by Rochelle Vasquez RN  Infection Prevention:   environmental surveillance performed   rest/sleep promoted  Taken 6/19/2024 1446 by Rochelle Vasquez RN  Infection Prevention:   environmental surveillance performed   rest/sleep promoted     Problem: Neurologic Impairment (Spinal Surgery)  Goal: Optimal Neurologic Function  Outcome: Ongoing, Progressing  Intervention: Optimize Neurologic Function  Recent Flowsheet Documentation  Taken 6/19/2024 1800 by Rochelle Vasquez RN  Body Position: legs elevated  Taken 6/19/2024 1600 by Rochelle Vasquez RN  Body Position: sitting up in bed  Taken 6/19/2024 1446 by Rochelle Vasquez RN  Body Position: sitting up in bed     Problem: Ongoing Anesthesia Effects (Spinal Surgery)  Goal: Anesthesia/Sedation Recovery  Outcome: Ongoing,  Progressing  Intervention: Optimize Anesthesia Recovery  Recent Flowsheet Documentation  Taken 6/19/2024 1800 by Rochelle Vasquez RN  Safety Promotion/Fall Prevention:   activity supervised   assistive device/personal items within reach   clutter free environment maintained   safety round/check completed   room organization consistent   toileting scheduled  Taken 6/19/2024 1600 by Rochelle Vasquez RN  Safety Promotion/Fall Prevention:   activity supervised   assistive device/personal items within reach   clutter free environment maintained   room organization consistent   safety round/check completed   toileting scheduled  Administration (IS): self-administered  Taken 6/19/2024 1446 by Rochelle Vasquez RN  Safety Promotion/Fall Prevention:   activity supervised   assistive device/personal items within reach   clutter free environment maintained   room organization consistent   safety round/check completed   toileting scheduled  Administration (IS):   instruction provided, initial   proper technique demonstrated   self-administered     Problem: Pain (Spinal Surgery)  Goal: Acceptable Pain Control  Outcome: Ongoing, Progressing     Problem: Postoperative Nausea and Vomiting (Spinal Surgery)  Goal: Nausea and Vomiting Relief  Outcome: Ongoing, Progressing     Problem: Postoperative Urinary Retention (Spinal Surgery)  Goal: Effective Urinary Elimination  Outcome: Ongoing, Progressing     Problem: Respiratory Compromise (Spinal Surgery)  Goal: Effective Oxygenation and Ventilation  Outcome: Ongoing, Progressing  Intervention: Optimize Oxygenation and Ventilation  Recent Flowsheet Documentation  Taken 6/19/2024 1800 by Rochelle Vasquez RN  Head of Bed (HOB) Positioning: HOB at 20-30 degrees  Taken 6/19/2024 1600 by Rochelle Vasquez RN  Head of Bed (HOB) Positioning: HOB at 30-45 degrees  Taken 6/19/2024 1446 by Rochelle Vasquez RN  Head of Bed (HOB) Positioning: HOB at 20-30 degrees   Goal Outcome Evaluation:  Plan of Care  Reviewed With: patient        Progress: no change       Pt alert and oriented x4. RA. VSS. Minimal complaints of pain controlled with PRN pain medication. Ambulated with PT and sat up in chair. Feet became numb in chair, pt moved back to bed with improvement. Plan to /c home tomorrow

## 2024-06-19 NOTE — ANESTHESIA PREPROCEDURE EVALUATION
Anesthesia Evaluation     Patient summary reviewed and Nursing notes reviewed   NPO Solid Status: > 8 hours  NPO Liquid Status: > 8 hours           Airway   Mallampati: I  TM distance: >3 FB  Neck ROM: full  No difficulty expected  Dental - normal exam     Pulmonary - normal exam   Cardiovascular   Exercise tolerance: good (4-7 METS)    Rhythm: regular  Rate: normal        Neuro/Psych  GI/Hepatic/Renal/Endo      Musculoskeletal     Abdominal    Substance History      OB/GYN          Other                    Anesthesia Plan    ASA 2     general     intravenous induction     Anesthetic plan, risks, benefits, and alternatives have been provided, discussed and informed consent has been obtained with: patient.    CODE STATUS:

## 2024-06-19 NOTE — OP NOTE
PREOPERATIVE DIAGNOSIS: L3-L4 and L4-L5 lumbar spinal stenosis.       POSTOPERATIVE DIAGNOSIS:  L3-L4 and L4-L5 lumbar spinal stenosis.      PROCEDURE PERFORMED:  L3-L4 and L4-L5 decompressive laminectomy, partial medial facetectomy and foraminotomies to decompress neural elements.      SURGEON: Grant Calix MD     ASSISTANT: GEE AMADO (The assistant was required for patient positioning, surgical approach, neural decompression, and wound closure).      ANESTHESIA: General.     ESTIMATED BLOOD LOSS:  30 mL    DESCRIPTION OF PROCEDURE: The patient was given a preoperative dose of intravenous Ancef. He was given a general anesthetic. He was placed in the prone position on the Tra frame. The back was prepped and draped sterilely.    A midline incision was made over L3-L4-L5.  The fascia was split and the paraspinal musculature was elevated. I identified levels by intraoperative x-ray. I started at L4-L5.  Using Kerrisons and a darien a midline laminectomy was performed of L3.  There was a moderate central stenosis. Ligamentum flavum was excised.  Bilateral medial facetectomies were performed.  The majority of the facets were preserved.  Bilateral foraminotomies were performed.  The neural elements at L4-L5 appeared decompressed.      I moved cephalad to L3-L4.  A similar decompression was performed.  A midline laminectomy was performed of L3 using Kerrisons and the darien.  The ligamentum flavum was excised.  There was a moderate central stenosis.  There was a moderately severe lateral recess stenosis.  Bilateral medial facetectomies were performed.  The majority of the facets were preserved.   Foraminotomies were performed.   The neural elements at L3-L4 appeared decompressed.      The dural sac was widely expanded at this point.  Using the ball tipped probe there was no longer any compression of the dural sac or nerve roots.    Cut bony surfaces were bone-waxed.  Floseal and Gelfoam were used to cover the  exposed dura.  A deep Hemovac drain was placed.        The wound was closed in layers using Vicryl. A sterile dressing was applied. Patient was awakened and transported to the recovery room in stable condition.

## 2024-06-19 NOTE — THERAPY EVALUATION
Patient Name: Bravo Rogers  : 1965    MRN: [id number]                              Today's Date: 2024       Admit Date: 2024    Visit Dx: No diagnosis found.  Patient Active Problem List   Diagnosis    Back pain    S/P lumbar laminectomy    Hyperlipidemia    Hypertension     Past Medical History:   Diagnosis Date    Elevated cholesterol     GERD (gastroesophageal reflux disease)     Hypertension     Seasonal allergies     Spinal stenosis      Past Surgical History:   Procedure Laterality Date    COLONOSCOPY      CYSTOSCOPY      lithotripsy, stent for kidney stone    FINGER / TOE CROSS FLAP      VASECTOMY      WISDOM TOOTH EXTRACTION        General Information       Row Name 24 1629          Physical Therapy Time and Intention    Document Type evaluation (P)   -     Mode of Treatment individual therapy;physical therapy (P)   -       Row Name 24 1629          General Information    Patient Profile Reviewed yes (P)   -     Prior Level of Function independent:;all household mobility;community mobility;ADL's (P)   -     Existing Precautions/Restrictions fall;spinal (P)   1 Hemovac drain  -     Barriers to Rehab none identified (P)   -       Row Name 24 1629          Living Environment    People in Home spouse (P)   -       Row Name 24 1629          Home Main Entrance    Number of Stairs, Main Entrance two;other (see comments) (P)   2 steps with a platform between  -     Stair Railings, Main Entrance none (P)   -       Row Name 24 1629          Stairs Within Home, Primary    Number of Stairs, Within Home, Primary none (P)   -       Row Name 24 1629          Cognition    Orientation Status (Cognition) oriented x 4 (P)   -       Row Name 24 1629          Safety Issues, Functional Mobility    Safety Issues Affecting Function (Mobility) awareness of need for assistance;insight into deficits/self-awareness;safety precaution awareness;safety  precautions follow-through/compliance (P)   -HM     Impairments Affecting Function (Mobility) balance;endurance/activity tolerance;pain;strength (P)   -     Comment, Safety Issues/Impairments (Mobility) Aware and following commands (P)   -HM               User Key  (r) = Recorded By, (t) = Taken By, (c) = Cosigned By      Initials Name Provider Type     Patsy Parker, PT Student PT Student                   Mobility       Row Name 06/19/24 1631          Bed Mobility    Bed Mobility supine-sit (P)   -HM     Supine-Sit L[place] (Bed Mobility) contact guard;verbal cues (P)   -     Assistive Device (Bed Mobility) bed rails (P)   -HM     Comment, (Bed Mobility) Pt showed good technique with logrolling with increased time needed to complete. Pt with mild complaints of dizziness upon sitting. BP check and was 113/77. Dizziness subsided with increased time sitting. (P)   -       Row Name 06/19/24 1631          Transfers    Comment, (Transfers) Pt educated on spinal precautions with VC for sequencing and hand placement. (P)   -       Row Name 06/19/24 1631          Sit-Stand Transfer    Sit-Stand Lyman (Transfers) contact guard;verbal cues (P)   -     Assistive Device (Sit-Stand Transfers) walker, front-wheeled (P)   -HM     Comment, (Sit-Stand Transfer) Pt with VC for pushing from bed and reaching back for chair upon sitting and standing. Pt with no complaints of dizziness upon standing. (P)   -       Row Name 06/19/24 1631          Gait/Stairs (Locomotion)    [place] Level (Gait) contact guard;1 person to manage equipment;verbal cues (P)   -     Assistive Device (Gait) walker, front-wheeled (P)   -HM     Distance in Feet (Gait) 350 (P)   -HM     Deviations/Abnormal Patterns (Gait) bilateral deviations;base of support, narrow;maryam decreased;gait speed decreased;stride length decreased (P)   -HM     Bilateral Gait Deviations forward flexed posture;heel strike decreased (P)   -HM      [place] Level (Stairs) not tested (P)   -     Comment, (Gait/Stairs) Pt able to ambulate 350' with CGA and FW. Pt required VC for standing upright and looking ahead. Pt able to walk with a step through gait pattern with further ambulation limited by activity tolerance. No LOB or knee buckling this session (P)   -               User Key  (r) = Recorded By, (t) = Taken By, (c) = Cosigned By      Initials Name Provider Type     Patsy Parker, PT Student PT Student                   Obj/Interventions       Row Name 06/19/24 1637          Range of Motion Comprehensive    General Range of Motion bilateral lower extremity ROM WNL (P)   -       Row Name 06/19/24 1637          Strength Comprehensive (MMT)    General Manual Muscle Testing (MMT) Assessment lower extremity strength deficits identified (P)   -     Comment, General Manual Muscle Testing (MMT) Assessment BLE grossly 4/5 (P)   -       Row Name 06/19/24 1637          Motor Skills    Therapeutic Exercise hip;knee;ankle;other (see comments) (P)   shoulder flexion stretch, BKFO, and abdominal hold x 10 reps  -       Row Name 06/19/24 1637          Hip (Therapeutic Exercise)    Hip (Therapeutic Exercise) isometric exercises;strengthening exercise (P)   -     Hip Isometrics (Therapeutic Exercise) bilateral;gluteal sets;3 second hold;10 repetitions (P)   -     Hip Strengthening (Therapeutic Exercise) bilateral;heel slides;10 repetitions (P)   -       Row Name 06/19/24 1637          Knee (Therapeutic Exercise)    Knee (Therapeutic Exercise) isometric exercises (P)   -     Knee Isometrics (Therapeutic Exercise) bilateral;quad sets;3 second hold;10 repetitions (P)   -       Row Name 06/19/24 1637          Ankle (Therapeutic Exercise)    Ankle (Therapeutic Exercise) AROM (active range of motion) (P)   -     Ankle AROM (Therapeutic Exercise) bilateral;dorsiflexion;plantarflexion;10 repetitions (P)   -       Row Name 06/19/24 1637           Balance    Balance Assessment sitting static balance;sitting dynamic balance;sit to stand dynamic balance;standing static balance;standing dynamic balance (P)   -     Static Sitting Balance standby assist (P)   -HM     Dynamic Sitting Balance contact guard (P)   -     Position, Sitting Balance unsupported;sitting in chair;sitting edge of bed (P)   -HM     Sit to Stand Dynamic Balance contact guard;verbal cues (P)   -HM     Static Standing Balance contact guard (P)   -     Dynamic Standing Balance contact guard;1 person to manage equipment;verbal cues (P)   -     Position/Device Used, Standing Balance supported;walker, front-wheeled (P)   -HM     Balance Interventions sitting;standing;sit to stand;occupation based/functional task (P)   -     Comment, Balance Pt with good static sitting and standing balance with mild unsteadiness during gait needed FWW for increased support. No LOB this session. (P)   -       Row Name 06/19/24 1638          Sensory Assessment (Somatosensory)    Sensory Assessment (Somatosensory) LE sensation intact;other (see comments) (P)   Pt with no radicular symptoms in BLE throughout session  -               User Key  (r) = Recorded By, (t) = Taken By, (c) = Cosigned By      Initials Name Provider Type     Patsy Parker, PT Student PT Student                   Goals/Plan       Row Name 06/19/24 1650          Bed Mobility Goal 1 (PT)    Activity/Assistive Device (Bed Mobility Goal 1, PT) sit to supine/supine to sit (P)   -     [place] Level/Cues Needed (Bed Mobility Goal 1, PT) independent (P)   -HM     Time Frame (Bed Mobility Goal 1, PT) short term goal (STG);3 days (P)   -     Progress/Outcomes (Bed Mobility Goal 1, PT) new goal (P)   -       Row Name 06/19/24 1650          Transfer Goal 1 (PT)    Activity/Assistive Device (Transfer Goal 1, PT) sit-to-stand/stand-to-sit;bed-to-chair/chair-to-bed (P)   -HM     [place] Level/Cues Needed (Transfer Goal 1, PT)  modified independence (P)   -HM     Time Frame (Transfer Goal 1, PT) long term goal (LTG);10 days (P)   -HM     Progress/Outcome (Transfer Goal 1, PT) new goal (P)   -       Row Name 06/19/24 1650          Gait Training Goal 1 (PT)    Activity/Assistive Device (Gait Training Goal 1, PT) gait (walking locomotion);assistive device use;walker, platform (P)   -HM     [place] Level (Gait Training Goal 1, PT) standby assist (P)   -HM     Distance (Gait Training Goal 1, PT) 500' (P)   -HM     Time Frame (Gait Training Goal 1, PT) long term goal (LTG);10 days (P)   -HM     Progress/Outcome (Gait Training Goal 1, PT) new goal (P)   -       Row Name 06/19/24 1650          Stairs Goal 1 (PT)    Activity/Assistive Device (Stairs Goal 1, PT) ascending stairs;descending stairs;assistive device use;walker, rolling (P)   -HM     [place] Level/Cues Needed (Stairs Goal 1, PT) standby assist (P)   -HM     Number of Stairs (Stairs Goal 1, PT) 2 (P)   -HM     Time Frame (Stairs Goal 1, PT) long term goal (LTG);10 days (P)   -HM     Progress/Outcome (Stairs Goal 1, PT) new goal (P)   -       Row Name 06/19/24 1650          Patient Education Goal (PT)    Activity (Patient Education Goal, PT) Able to verbalize 3/3 spinal precautions and demonstrate good logrolling technique. (P)   -HM     Time Frame (Patient Education Goal, PT) long term goal (LTG);10 days (P)   -HM     Progress/Outcome (Patient Education Goal, PT) new goal (P)   -       Row Name 06/19/24 8310          Therapy Assessment/Plan (PT)    Planned Therapy Interventions (PT) balance training;bed mobility training;gait training;home exercise program;motor coordination training;stretching;strengthening;stair training;ROM (range of motion);postural re-education;patient/family education;neuromuscular re-education;transfer training (P)   -               User Key  (r) = Recorded By, (t) = Taken By, (c) = Cosigned By      Initials Name Provider Type    NACHO Parker  Patsy FELIPE, PT Student PT Student                   Clinical Impression       Row Name 06/19/24 1645          Pain    Pretreatment Pain Rating 7/10 (P)   -HM     Posttreatment Pain Rating 3/10 (P)   -     Pain Location lower (P)   -     Pain Location - back (P)   -     Pre/Posttreatment Pain Comment Pt reported increased comfort in recliner compared to bed. Pt reported decreased pain following ambulation (P)   -     Pain Intervention(s) Cold applied;Repositioned;Ambulation/increased activity;Elevated (P)   -       Row Name 06/19/24 1643          Plan of Care Review    Outcome Evaluation Pt able to ambulate 350' with FWW and CGA. Pt with no radicular symptoms in BLE throughout session. Pt presents below baseline with deficits in strength, balance, activity tolerance, and pain. IPPT would be appropriate during admission to address functional goals. PT recommended DC to home with assist when medicaly appropriate. (P)   -       Row Name 06/19/24 1643          Therapy Assessment/Plan (PT)    Patient/Family Therapy Goals Statement (PT) Get back to [email or web address] (P)   -     Rehab Potential (PT) good, to achieve stated therapy goals (P)   -     Criteria for Skilled Interventions Met (PT) yes;meets criteria;skilled treatment is necessary (P)   -     Therapy Frequency (PT) daily (P)   -     Predicted Duration of Therapy Intervention (PT) 2 days (P)   -       Row Name 06/19/24 1643          Vital Signs    Pre Systolic BP Rehab 123 (P)   -HM     Pre Treatment Diastolic BP 77 (P)   -     Intra Systolic BP Rehab 113 (P)   -HM     Intra Treatment Diastolic BP 77 (P)   -HM     Pretreatment Heart Rate (beats/min) 71 (P)   -HM     Pre SpO2 (%) 93 (P)   -     O2 Delivery Pre Treatment room air (P)   -HM     Pre Patient Position Supine (P)   -HM     Intra Patient Position Sitting (P)   -       Row Name 06/19/24 1983          Positioning and Restraints    Pre-Treatment Position in bed (P)   -     Post Treatment  Position chair (P)   -HM     In Chair reclined;call light within reach;encouraged to call for assist;exit alarm on;with family/caregiver;waffle cushion;compression device;legs elevated (P)   -               User Key  (r) = Recorded By, (t) = Taken By, (c) = Cosigned By      Initials Name Provider Type     Patsy Parker, PT Student PT Student                   Outcome Measures       Row Name 06/19/24 1654 06/19/24 1446       How much help from another person do you currently need...    Turning from your back to your side while in flat bed without using bedrails? 4 (P)   -HM 4  -GS    Moving from lying on back to sitting on the side of a flat bed without bedrails? 3 (P)   -HM 4  -GS    Moving to and from a bed to a chair (including a wheelchair)? 3 (P)   - 3  -GS    Standing up from a chair using your arms (e.g., wheelchair, bedside chair)? 3 (P)   - 3  -GS    Climbing 3-5 steps with a railing? 3 (P)   - 2  -GS    To walk in hospital room? 3 (P)   - 3  -GS    AM-PAC 6 Clicks Score (PT) 19 (P)   - 19  -GS    Highest Level of Mobility Goal 6 --> Walk 10 steps or more (P)   - 6 --> Walk 10 steps or more  -GS      Row Name 06/19/24 1654          Functional Assessment    Outcome Measure Options AM-PAC 6 Clicks Basic Mobility (PT) (P)   -               User Key  (r) = Recorded By, (t) = Taken By, (c) = Cosigned By      Initials Name Provider Type     Rochelle Vasquez RN Registered Nurse     Patsy Parker, PT Student PT Student                                 Physical Therapy Education       Title: PT OT SLP Therapies (Done)       Topic: Physical Therapy (Done)       Point: Mobility training (Done)       Learning Progress Summary             Patient Acceptance, E,H,TB, VU by  at 6/19/2024 1654    Comment: Pt educated on spinal precautions and HEP   Family Acceptance, E,H,TB, VU by  at 6/19/2024 1654    Comment: Pt educated on spinal precautions and HEP                         Point: Home  exercise program (Done)       Learning Progress Summary             Patient Acceptance, E,H,TB, VU by  at 6/19/2024 1654    Comment: Pt educated on spinal precautions and HEP   Family Acceptance, E,H,TB, VU by  at 6/19/2024 1654    Comment: Pt educated on spinal precautions and HEP                         Point: Body mechanics (Done)       Learning Progress Summary             Patient Acceptance, E,H,TB, VU by  at 6/19/2024 1654    Comment: Pt educated on spinal precautions and HEP   Family Acceptance, E,H,TB, VU by  at 6/19/2024 1654    Comment: Pt educated on spinal precautions and HEP                         Point: Precautions (Done)       Learning Progress Summary             Patient Acceptance, E,H,TB, VU by  at 6/19/2024 1654    Comment: Pt educated on spinal precautions and HEP   Family Acceptance, E,H,TB, VU by  at 6/19/2024 1654    Comment: Pt educated on spinal precautions and HEP                                         User Key       Initials Effective Dates Name Provider Type Discipline     05/07/24 -  Patsy Parker, PT Student PT Student PT                  PT Recommendation and Plan  Planned Therapy Interventions (PT): (P) balance training, bed mobility training, gait training, home exercise program, motor coordination training, stretching, strengthening, stair training, ROM (range of motion), postural re-education, patient/family education, neuromuscular re-education, transfer training  Outcome Evaluation: (P) Pt able to ambulate 350' with FWW and CGA. Pt with no radicular symptoms in BLE throughout session. Pt presents below baseline with deficits in strength, balance, activity tolerance, and pain. IPPT would be appropriate during admission to address functional goals. PT recommended DC to home with assist when medicaly appropriate.     Time Calculation:   PT Evaluation Complexity  History, PT Evaluation Complexity: (P) 1-2 personal factors and/or comorbidities  Examination of Body  Systems (PT Eval Complexity): (P) total of 3 or more elements  Clinical Presentation (PT Evaluation Complexity): (P) stable  Clinical Decision Making (PT Evaluation Complexity): (P) low complexity  Overall Complexity (PT Evaluation Complexity): (P) low complexity     PT Charges       Row Name 06/19/24 1548             Time Calculation    Start Time 1548 (P)   -HM      PT Received On 06/19/24 (P)   -HM      PT Goal Re-Cert Due Date 06/29/24 (P)   -HM         Timed Charges    19562 - PT Therapeutic Exercise Minutes 15 (P)   -      75108 - Gait Training Minutes  10 (P)   -HM         Untimed Charges    PT Eval/Re-eval Minutes 37 (P)   -HM         Total Minutes    Timed Charges Total Minutes 25 (P)   -HM      Untimed Charges Total Minutes 37 (P)   -HM       Total Minutes 62 (P)   -                User Key  (r) = Recorded By, (t) = Taken By, (c) = Cosigned By      Initials Name Provider Type     Patsy Parker, PT Student PT Student                  Therapy Charges for Today       Code Description Service Date Service Provider Modifiers Qty    24411785681 HC PT THER PROC EA 15 MIN 6/19/2024 Patsy Parker, PT Student GP 1    31593547965 HC GAIT TRAINING EA 15 MIN 6/19/2024 Patsy Parker, PT Student GP 1    40383262765 HC PT EVAL LOW COMPLEXITY 3 6/19/2024 Patsy Parker, PT Student GP 1            PT G-Codes  Outcome Measure Options: (P) AM-PAC 6 Clicks Basic Mobility (PT)  AM-PAC 6 Clicks Score (PT): (P) 19  PT Discharge Summary  Anticipated Discharge Disposition (PT): (P) home with assist    Patsy Parker PT Student  6/19/2024

## 2024-06-19 NOTE — OP NOTE
Dictation on: 06/19/2024 12:51 PM by: NAM HAYWARD [[id number]]      exposed dura.  A deep Hemovac drain was placed.        The wound was closed in layers using Vicryl. A sterile dressing was applied. Patient was awakened and transported to the recovery room in stable condition.

## 2024-06-19 NOTE — ANESTHESIA PROCEDURE NOTES
Airway  Urgency: elective    Date/Time: 6/19/2024 10:48 AM  Airway not difficult    General Information and Staff    Patient location during procedure: OR    Indications and Patient Condition  Indications for airway management: airway protection    Preoxygenated: yes  MILS not maintained throughout  Mask difficulty assessment: 2 - vent by mask + OA or adjuvant +/- NMBA    Final Airway Details  Final airway type: endotracheal airway      Successful airway: ETT  Cuffed: yes   Successful intubation technique: direct laryngoscopy  Facilitating devices/methods: intubating stylet  Endotracheal tube insertion site: oral  Blade: Gómez  Blade size: 2  ETT size (mm): 7.5  Cormack-Lehane Classification: grade I - full view of glottis  Placement verified by: chest auscultation and capnometry   Inital cuff pressure (cm H2O): 9  Measured from: lips  ETT/EBT  to lips (cm): 22  Number of attempts at approach: 1  Assessment: lips, teeth, and gum same as pre-op and atraumatic intubation    Additional Comments  Negative epigastric sounds, Breath sound equal bilaterally with symmetric chest rise and fall

## 2024-06-20 VITALS
HEIGHT: 72 IN | OXYGEN SATURATION: 93 % | WEIGHT: 240 LBS | RESPIRATION RATE: 18 BRPM | SYSTOLIC BLOOD PRESSURE: 111 MMHG | HEART RATE: 70 BPM | DIASTOLIC BLOOD PRESSURE: 62 MMHG | BODY MASS INDEX: 32.51 KG/M2 | TEMPERATURE: 97.9 F

## 2024-06-20 LAB
ANION GAP SERPL CALCULATED.3IONS-SCNC: 10 MMOL/L (ref 5–15)
BUN SERPL-MCNC: 18 MG/DL (ref 8–23)
BUN/CREAT SERPL: 16.2 (ref 7–25)
CALCIUM SPEC-SCNC: 8.8 MG/DL (ref 8.6–10.5)
CHLORIDE SERPL-SCNC: 104 MMOL/L (ref 98–107)
CO2 SERPL-SCNC: 25 MMOL/L (ref 22–29)
CREAT SERPL-MCNC: 1.11 MG/DL (ref 0.76–1.27)
DEPRECATED RDW RBC AUTO: 40 FL (ref 37–54)
EGFRCR SERPLBLD CKD-EPI 2021: 75.5 ML/MIN/1.73
ERYTHROCYTE [DISTWIDTH] IN BLOOD BY AUTOMATED COUNT: 12.1 % (ref 12.3–15.4)
GLUCOSE SERPL-MCNC: 145 MG/DL (ref 65–99)
HCT VFR BLD AUTO: 38.4 % (ref 37.5–51)
HGB BLD-MCNC: 13.5 G/DL (ref 13–17.7)
MCH RBC QN AUTO: 31.9 PG (ref 26.6–33)
MCHC RBC AUTO-ENTMCNC: 35.2 G/DL (ref 31.5–35.7)
MCV RBC AUTO: 90.8 FL (ref 79–97)
PLATELET # BLD AUTO: 160 10*3/MM3 (ref 140–450)
PMV BLD AUTO: 10.4 FL (ref 6–12)
POTASSIUM SERPL-SCNC: 4.7 MMOL/L (ref 3.5–5.2)
QT INTERVAL: 406 MS
QTC INTERVAL: 438 MS
RBC # BLD AUTO: 4.23 10*6/MM3 (ref 4.14–5.8)
SODIUM SERPL-SCNC: 139 MMOL/L (ref 136–145)
WBC NRBC COR # BLD AUTO: 13.03 10*3/MM3 (ref 3.4–10.8)

## 2024-06-20 PROCEDURE — 63710000001 ONDANSETRON ODT 4 MG TABLET DISPERSIBLE: Performed by: INTERNAL MEDICINE

## 2024-06-20 PROCEDURE — 25010000002 CEFAZOLIN PER 500 MG: Performed by: ORTHOPAEDIC SURGERY

## 2024-06-20 PROCEDURE — 97165 OT EVAL LOW COMPLEX 30 MIN: CPT

## 2024-06-20 PROCEDURE — 80048 BASIC METABOLIC PNL TOTAL CA: CPT | Performed by: NURSE PRACTITIONER

## 2024-06-20 PROCEDURE — 97535 SELF CARE MNGMENT TRAINING: CPT

## 2024-06-20 PROCEDURE — 85027 COMPLETE CBC AUTOMATED: CPT | Performed by: NURSE PRACTITIONER

## 2024-06-20 PROCEDURE — 97116 GAIT TRAINING THERAPY: CPT

## 2024-06-20 RX ORDER — CELECOXIB 200 MG/1
200 CAPSULE ORAL DAILY
Start: 2024-06-22

## 2024-06-20 RX ORDER — HYDROCODONE BITARTRATE AND ACETAMINOPHEN 7.5; 325 MG/1; MG/1
1 TABLET ORAL EVERY 4 HOURS PRN
Qty: 42 TABLET | Refills: 0 | Status: SHIPPED | OUTPATIENT
Start: 2024-06-20

## 2024-06-20 RX ORDER — ONDANSETRON 4 MG/1
4 TABLET, ORALLY DISINTEGRATING ORAL EVERY 6 HOURS PRN
Status: DISCONTINUED | OUTPATIENT
Start: 2024-06-20 | End: 2024-06-20 | Stop reason: HOSPADM

## 2024-06-20 RX ORDER — POLYETHYLENE GLYCOL 3350 17 G/17G
17 POWDER, FOR SOLUTION ORAL DAILY
Qty: 238 G | Refills: 0 | Status: SHIPPED | OUTPATIENT
Start: 2024-06-20

## 2024-06-20 RX ORDER — ASPIRIN 81 MG/1
81 TABLET ORAL DAILY
Start: 2024-06-21

## 2024-06-20 RX ORDER — ONDANSETRON 4 MG/1
4 TABLET, ORALLY DISINTEGRATING ORAL EVERY 8 HOURS PRN
Qty: 15 TABLET | Refills: 0 | Status: SHIPPED | OUTPATIENT
Start: 2024-06-20

## 2024-06-20 RX ADMIN — SODIUM CHLORIDE 2000 MG: 900 INJECTION INTRAVENOUS at 02:06

## 2024-06-20 RX ADMIN — HYDROCODONE BITARTRATE AND ACETAMINOPHEN 1 TABLET: 7.5; 325 TABLET ORAL at 07:01

## 2024-06-20 RX ADMIN — HYDROCODONE BITARTRATE AND ACETAMINOPHEN 1 TABLET: 7.5; 325 TABLET ORAL at 00:24

## 2024-06-20 RX ADMIN — HYDROCODONE BITARTRATE AND ACETAMINOPHEN 1 TABLET: 7.5; 325 TABLET ORAL at 13:02

## 2024-06-20 RX ADMIN — ONDANSETRON 4 MG: 4 TABLET, ORALLY DISINTEGRATING ORAL at 13:58

## 2024-06-20 RX ADMIN — ACETAMINOPHEN 650 MG: 325 TABLET ORAL at 00:24

## 2024-06-20 NOTE — CASE MANAGEMENT/SOCIAL WORK
"Discharge Planning Assessment  [facility]     Patient Name: Bravo Rogers  MRN: [id number]  Today's Date: 6/20/2024    Admit Date: 6/19/2024    Plan: Home with wife's assistance and a rolling walker from [employer]       Discharge Plan       Row Name 06/20/24 1403       Plan    Plan Home with wife's assistance and a rolling walker from [employer]    Patient/Family in Agreement with Plan yes    Plan Comments Met with Mr. Rogers, at the bedside, for discharge planning.    Mr. Rogers is being discharged to home today.    He has been evaluated by PT and \" Pt ambulated 350ft with CGA and RW for support. Pt navigated 1 step with use of RW and CGA.\"    Mr. Rogers requested a rolling walker for home use and did not have preference for provider.  [facility] and they are delivering the walker to the hospital room today.  No other discharge needs noted.     DC plan for Mr. Rogers is to return home with his wife's assistance, as needed.  The patient's wife will be transporting him home when discharged.    CM will continue to follow.    Final Discharge Disposition Code 01 - home or self-care                  Continued Care and Services - Discharged on 6/20/2024 Admission date: 6/19/2024 - Discharge disposition: Home or Self Care      Durable Medical Equipment       Service Provider Request Status Selected Services Address Phone Fax Patient Preferred    [facility] [place]  Selected Durable Medical Equipment Monserrat VICTOR [address] [place] [id number] [phone] --                  Expected Discharge Date and Time       Expected Discharge Date Expected Discharge Time    Jun 20, 2024           Usha Connor RN    "

## 2024-06-20 NOTE — DISCHARGE SUMMARY
Patient Name: Bravo Rogers  MRN: [id number]  : 1965  DOS: 2024    Attending: Grant Calix MD    Primary Care Provider: Yoni Brunson MD    Date of Admission:.2024  7:36 AM    Date of Discharge:  2024    Discharge Diagnosis:   S/P lumbar laminectomy    Back pain    Hyperlipidemia    Hypertension      Hospital Course    At Admit:    Patient is a pleasant 59 y.o. male presented for scheduled surgery by Dr. Calix.  He underwent lumbar laminectomy under general anesthesia.  He tolerated surgery well and was admitted for further medical management.  He has had worsening back pain over the last couple years.  The pain radiated down bilateral lower extremities, R>L.  He denies saddle anesthesia.     When seen postop he is doing well.  His pain is well-controlled.  He denies nausea, shortness of breath or chest pain.  No history of DVT or PE.    After admit:    Patient was provided pain medications as needed for pain control.    Adjustments were made to pain medications to optimize postop pain management.     Risks and benefits of opiate medications discussed with patient.  Ethan report in chart was reviewed prior to discharge.    He was seen by PT and has progressed well over his stay.  pt used an IS for atelectasis prophylaxis and mechanicals for DVT prophylaxis.    Home medications were resumed as appropriate, and labs were monitored and remained fairly stable.     With the progress he has made,  is ready for DC home today.    Discussed with patient regarding plan and he shows understanding and agreement.       Pain medication at discharge per . .      Procedures Performed  L3-L4 and L4-L5 decompressive laminectomy, partial medial facetectomy and foraminotomies to decompress neural elements.       Pertinent Test Results:    I reviewed the patient's new clinical results.   Results from last 7 days   Lab Units 24  0613 24  1355   WBC 10*3/mm3 13.03* 5.49  "  HEMOGLOBIN g/dL 13.5 16.3   HEMATOCRIT % 38.4 46.1   PLATELETS 10*3/mm3 160 154     Results from last 7 days   Lab Units 24  0613 24  1355   SODIUM mmol/L 139  --    POTASSIUM mmol/L 4.7 4.2   CHLORIDE mmol/L 104  --    CO2 mmol/L 25.0  --    BUN mg/dL 18  --    CREATININE mg/dL 1.11  --    CALCIUM mg/dL 8.8  --    GLUCOSE mg/dL 145*  --      I reviewed the patient's new imaging including images and reports.      Physical therapy:   Jude Helms, PT   Physical Therapist  Specialty: Physical Therapy     Plan of Care     Signed     Date of Service: 24 1001  Creation Time: 24 1322     Signed         Goal Outcome Evaluation:  Plan of Care Reviewed With: patient, spouse  Progress: improving  Outcome Evaluation: Pt continues to present with decreased functional mobility and decreased activity tolerance compared to baseline. Pt ambulated 350ft with CGA and RW for support. Pt navigated 1 step with use of RW and CGA. Continue to progress per pt tolerance.        Anticipated Discharge Disposition (PT): home with assist                     Discharge Assessment:    Vital Signs  Visit Vitals  /62 (BP Location: Left arm, Patient Position: Lying)   Pulse 70   Temp 97.9 °F (36.6 °C) (Oral)   Resp 18   Ht 182.9 cm (72\")   Wt 109 kg (240 lb)   SpO2 93%   BMI 32.55 kg/m²     Temp (24hrs), Av.7 °F (36.5 °C), Min:97.4 °F (36.3 °C), Max:98 °F (36.7 °C)      General Appearance:    Alert, cooperative, in no acute distress   Lungs:     Clear to auscultation,respirations regular, even and unlabored    Heart:    Regular rhythm and normal rate, normal S1 and S2    Abdomen:     Normal bowel sounds, no masses, no organomegaly, soft  non-tender, non-distended, no guarding, no rebound tenderness   Extremities:   Moves all extremities well, no edema, no cyanosis, no redness   Pulses:   Pulses palpable and equal bilaterally   Skin:   No bleeding, bruising or rash. Back dressing CDI.   Neurologic:   Cranial " nerves 2 - 12 grossly intact, sensation intact, no gross deficit. Flexion and dorsiflexion intact bilateral feet.       Discharge Disposition: Home.         Discharge Medications        New Medications        Instructions Start Date   HYDROcodone-acetaminophen 7.5-325 MG per tablet  Commonly known as: Norco   1 tablet, Oral, Every 4 Hours PRN      ondansetron ODT 4 MG disintegrating tablet  Commonly known as: ZOFRAN-ODT   4 mg, Translingual, Every 8 Hours PRN      polyethylene glycol 17 g packet  Commonly known as: MIRALAX   17 g, Oral, Daily             Changes to Medications        Instructions Start Date   celecoxib 200 MG capsule  Commonly known as: CeleBREX  What changed: These instructions start on June 22, 2024. If you are unsure what to do until then, ask your doctor or other care provider.   200 mg, Oral, Daily   Start Date: June 22, 2024            Continue These Medications        Instructions Start Date   aspirin 81 MG EC tablet   81 mg, Oral, Daily   Start Date: June 21, 2024     atorvastatin 20 MG tablet  Commonly known as: LIPITOR   20 mg, Oral, Daily      eszopiclone 3 MG tablet  Commonly known as: LUNESTA   3 mg, Oral, Every Night at Bedtime      famotidine 10 MG tablet  Commonly known as: PEPCID   20 mg, Oral, Daily      loratadine 10 MG tablet  Commonly known as: CLARITIN   10 mg, Oral, Daily      tamsulosin 0.4 MG capsule 24 hr capsule  Commonly known as: FLOMAX   1 capsule, Oral, Daily      Testosterone 1.62 % gel   APPLY 1 PUMP TO EACH ARM EVERY DAY      valsartan-hydrochlorothiazide 160-12.5 MG per tablet  Commonly known as: DIOVAN-HCT   0.5 tablets, Oral, Nightly               Discharge Diet: Resume prior.      Activity at Discharge:   Ambulate.  Restrictions per Dr. Calix      Follow-up Appointments  Grant Calix MD per his orders in approximately 4 weeks     Aldair Robert MD  06/20/24  13:26 EDT

## 2024-06-20 NOTE — PROGRESS NOTES
"Ortho Spine Progress Note     LOS: 0 days   Patient Care Team:  Yoni Brunson MD as PCP - General (Family Medicine)    Subjective: Doing well.  Ready to go home.    Objective:   Vital Signs:  /62 (BP Location: Left arm, Patient Position: Lying)   Pulse 70   Temp 97.9 °F (36.6 °C) (Oral)   Resp 18   Ht 182.9 cm (72\")   Wt 109 kg (240 lb)   SpO2 93%   BMI 32.55 kg/m²     Labs:  Lab Results (last 24 hours)       Procedure Component Value Units Date/Time    Basic Metabolic Panel [[id number]]  (Abnormal) Collected: 06/20/24 0613    Specimen: Blood Updated: 06/20/24 0702     Glucose 145 mg/dL      BUN 18 mg/dL      Creatinine 1.11 mg/dL      Sodium 139 mmol/L      Potassium 4.7 mmol/L      Chloride 104 mmol/L      CO2 25.0 mmol/L      Calcium 8.8 mg/dL      BUN/Creatinine Ratio 16.2     Anion Gap 10.0 mmol/L      eGFR 75.5 mL/min/1.73     Narrative:      GFR Normal >60  Chronic Kidney Disease <60  Kidney Failure <15      CBC (No Diff) [[id number]]  (Abnormal) Collected: 06/20/24 0613    Specimen: Blood Updated: 06/20/24 0643     WBC 13.03 10*3/mm3      RBC 4.23 10*6/mm3      Hemoglobin 13.5 g/dL      Hematocrit 38.4 %      MCV 90.8 fL      MCH 31.9 pg      MCHC 35.2 g/dL      RDW 12.1 %      RDW-SD 40.0 fl      MPV 10.4 fL      Platelets 160 10*3/mm3             Awake, alert, oriented.  Motor intact.    Assessment/Plan: Okay to go home if okay with Dr. GOULD/Sofya.  I will send in a prescription for Norco 7.5, #42, for home use.  I will also send a prescription for Zofran 4 mg for as needed use.  I discussed wound care, restrictions and follow-up care with patient.  I will see back in approximately 4 weeks.    Grant Calix MD  06/20/24  11:13 EDT      "

## 2024-06-20 NOTE — PLAN OF CARE
Goal Outcome Evaluation:  Plan of Care Reviewed With: patient        Progress: no change     Pt is alert and oriented X 4. VSS. RA.Voiding well.

## 2024-06-20 NOTE — THERAPY EVALUATION
Patient Name: Bravo Rogers  : 1965    MRN: [id number]                              Today's Date: 2024       Admit Date: 2024    Visit Dx:     ICD-10-CM ICD-9-CM   1. S/P lumbar laminectomy  Z98.890 V45.89   2. Low back pain with sciatica, sciatica laterality unspecified, unspecified back pain laterality, unspecified chronicity  M54.40 724.3     Patient Active Problem List   Diagnosis    Back pain    S/P lumbar laminectomy    Hyperlipidemia    Hypertension     Past Medical History:   Diagnosis Date    Elevated cholesterol     GERD (gastroesophageal reflux disease)     Hypertension     Seasonal allergies     Spinal stenosis      Past Surgical History:   Procedure Laterality Date    COLONOSCOPY      CYSTOSCOPY      lithotripsy, stent for kidney stone    FINGER / TOE CROSS FLAP      VASECTOMY      WISDOM TOOTH EXTRACTION        General Information       Row Name 24 1138          OT Time and Intention    Document Type evaluation  -     Mode of Treatment occupational therapy  -       Row Name 24 1138          General Information    Patient Profile Reviewed yes  -LC     Prior Level of Function independent:;all household mobility;transfer;ADL's  -LC     Existing Precautions/Restrictions fall;spinal;other (see comments)  hemovac  -     Barriers to Rehab none identified  -LC       Row Name 24 1138          Living Environment    People in Home spouse  -LC       Row Name 24 1138          Home Main Entrance    Number of Stairs, Main Entrance two  -LC     Stair Railings, Main Entrance none  -LC       Row Name 24 1138          Stairs Within Home, Primary    Number of Stairs, Within Home, Primary none  -LC       Row Name 24 1138          Cognition    Orientation Status (Cognition) oriented x 4  -LC       Row Name 24 1138          Safety Issues, Functional Mobility    Safety Issues Affecting Function (Mobility) insight into deficits/self-awareness;safety precaution  awareness  -     Impairments Affecting Function (Mobility) balance;endurance/activity tolerance;postural/trunk control;strength  -               User Key  (r) = Recorded By, (t) = Taken By, (c) = Cosigned By      Initials Name Provider Type     Patricia Cheatham OT Occupational Therapist                     Mobility/ADL's       Row Name 06/20/24 1145          Bed Mobility    Bed Mobility scooting/bridging;supine-sit;sit-supine  -     Scooting/Bridging [place] (Bed Mobility) standby assist;verbal cues  -LC     Supine-Sit [place] (Bed Mobility) standby assist;verbal cues  -     Sit-Supine [place] (Bed Mobility) standby assist;verbal cues  -     Comment, (Bed Mobility) Educated on log roll technique to transition from supine to sit and sit to supine. Demonstrates good sequencing. Denied dizziness.  -       Row Name 06/20/24 1145          Transfers    Transfers sit-stand transfer  -     Comment, (Transfers) VC's for hand placement and to limit trunk flexion to adhere to spinal precautions.  -       Row Name 06/20/24 1145          Sit-Stand Transfer    Sit-Stand [place] (Transfers) contact guard;verbal cues  -     Assistive Device (Sit-Stand Transfers) walker, front-wheeled  -       Row Name 06/20/24 1145          Activities of Daily Living    BADL Assessment/Intervention bathing;lower body dressing;toileting  -[facility] Name 06/20/24 1145          Bathing Assessment/Intervention    [place] Level (Bathing) lower body  -     Assistive Devices (Bathing) long-handled sponge  -     Position (Bathing) edge of bed sitting  -     Comment, (Bathing) Educated pt. on use of LHS to facilitate maintaining spinal precautions during LBB. Simulated with good understanding.  -       Row Name 06/20/24 1145          Lower Body Dressing Assessment/Training    [place] Level (Lower Body Dressing) minimum assist (75% patient effort)  -     Assistive Devices (Lower Body Dressing)  long-handled shoe horn;reacher;sock-aid  -     Position (Lower Body Dressing) edge of bed sitting  -     Comment, (Lower Body Dressing) Educated pt. on use of AE for LBD to facilitate maintaining spinal precautions during task. Trialed AE with good understanding.  -[facility] Name 06/20/24 1145          Toileting Assessment/Training    Assistive Devices (Toileting) toilet paper aid  -     Comment, (Toileting) Educated pt. on use of AE to faciliate maintaining spinal precautions during hygiene. Verbalized understanding.  -               User Key  (r) = Recorded By, (t) = Taken By, (c) = Cosigned By      Initials Name Provider Type     Patricia Cheatham OT Occupational Therapist                   Obj/Interventions       Row Name 06/20/24 1155          Sensory Assessment (Somatosensory)    Sensory Assessment (Somatosensory) UE sensation intact  -[facility] Name 06/20/24 1155          Vision Assessment/Intervention    Visual Impairment/Limitations WFL  -[facility] Name 06/20/24 1155          Range of Motion Comprehensive    General Range of Motion bilateral upper extremity ROM WNL  -[facility] Name 06/20/24 1155          Strength Comprehensive (MMT)    General Manual Muscle Testing (MMT) Assessment upper extremity strength deficits identified  -[facility] Name 06/20/24 1155          Upper Extremity (Manual Muscle Testing)    Upper Extremity: Manual Muscle Testing (MMT) left UE strength is WNL;right UE strength is WNL  -[facility] Name 06/20/24 1155          Balance    Balance Assessment sitting static balance;sitting dynamic balance;standing static balance;standing dynamic balance  -     Static Sitting Balance standby assist  -     Dynamic Sitting Balance standby assist  -     Position, Sitting Balance unsupported;sitting in chair  -     Static Standing Balance standby assist  -     Dynamic Standing Balance contact guard  -     Position/Device Used, Standing Balance supported;walker,  front-wheeled  -     Balance Interventions sitting;standing;sit to stand;supported;occupation based/functional task;weight shifting activity  -     Comment, Balance No LOB  -               User Key  (r) = Recorded By, (t) = Taken By, (c) = Cosigned By      Initials Name Provider Type    Patricia Boyd OT Occupational Therapist                   Goals/Plan       Row Name 06/20/24 1206          Transfer Goal 1 (OT)    Activity/Assistive Device (Transfer Goal 1, OT) sit-to-stand/stand-to-sit;bed-to-chair/chair-to-bed;walker, rolling;toilet  -     [place] Level/Cues Needed (Transfer Goal 1, OT) supervision required  -     Time Frame (Transfer Goal 1, OT) long term goal (LTG);by discharge  -     Progress/Outcome (Transfer Goal 1, OT) goal ongoing  [facility]       Row Name 06/20/24 1206          Dressing Goal 1 (OT)    Activity/Device (Dressing Goal 1, OT) lower body dressing;long-handled shoe horn;sock-aid;reacher  -     [place]/Cues Needed (Dressing Goal 1, OT) set-up required  -     Time Frame (Dressing Goal 1, OT) long term goal (LTG);by discharge  -     Progress/Outcome (Dressing Goal 1, OT) goal ongoing  -       Row Name 06/20/24 1206          Toileting Goal 1 (OT)    Activity/Device (Toileting Goal 1, OT) adjust/manage clothing;perform perineal hygiene;commode;toilet paper aid;grab bar/safety frame  -     [place] Level/Cues Needed (Toileting Goal 1, OT) standby assist  -     Time Frame (Toileting Goal 1, OT) long term goal (LTG);by discharge  -     Progress/Outcome (Toileting Goal 1, OT) goal ongoing  [facility]       Row Name 06/20/24 1206          Therapy Assessment/Plan (OT)    Planned Therapy Interventions (OT) activity tolerance training;adaptive equipment training;BADL retraining;occupation/activity based interventions;functional balance retraining;patient/caregiver education/training;strengthening exercise;transfer/mobility retraining  -               User Key  (r) =  Recorded By, (t) = Taken By, (c) = Cosigned By      Initials Name Provider Type    Patricia Boyd OT Occupational Therapist                   Clinical Impression       Row Name 06/20/24 1202          Pain Assessment    Pain Intervention(s) Repositioned;Ambulation/increased activity  -     Additional Documentation Pain Scale: FACES Pre/Post-Treatment (Group)  -LC       Row Name 06/20/24 1202          Pain Scale: FACES Pre/Post-Treatment    Pain: FACES Scale, Pretreatment 2-->hurts little bit  -     Posttreatment Pain Rating 2-->hurts little bit  -LC       Row Name 06/20/24 1202          Plan of Care Review    Plan of Care Reviewed With patient;spouse  -     Progress no change  -     Outcome Evaluation Pt. educated on spinal precautions during ADL and functional mobility. Reviewed AE to facilitate maintaining precautions with good understanding. Will continue to progress as able. Recommend home with assist at discharge.  -LC       Row Name 06/20/24 1202          Therapy Assessment/Plan (OT)    Patient/Family Therapy Goal Statement (OT) To take care of self  -     Therapy Frequency (OT) daily  -     Predicted Duration of Therapy Intervention (OT) 2 days  -LC       Row Name 06/20/24 1202          Therapy Plan Review/Discharge Plan (OT)    Anticipated Discharge Disposition (OT) home with assist  -LC       Row Name 06/20/24 1202          Positioning and Restraints    Pre-Treatment Position in bed  -     Post Treatment Position bed  -     In Bed notified nsg;supine;call light within reach;encouraged to call for assist;exit alarm on;with family/caregiver  -               User Key  (r) = Recorded By, (t) = Taken By, (c) = Cosigned By      Initials Name Provider Type    Patricia Boyd OT Occupational Therapist                   Outcome Measures       [facility] Name 06/20/24 1207          How much help from another is currently needed...    Putting on and taking off regular lower body clothing? 3  -      Bathing (including washing, rinsing, and drying) 3  -LC     Toileting (which includes using toilet bed pan or urinal) 3  -LC     Putting on and taking off regular upper body clothing 3  -LC     Taking care of personal grooming (such as brushing teeth) 4  -LC     Eating meals 4  -     AM-PAC 6 Clicks Score (OT) 20  -       Row Name 06/20/24 0735          How much help from another person do you currently need...    Turning from your back to your side while in flat bed without using bedrails? 4  -GS     Moving from lying on back to sitting on the side of a flat bed without bedrails? 4  -GS     Moving to and from a bed to a chair (including a wheelchair)? 3  -GS     Standing up from a chair using your arms (e.g., wheelchair, bedside chair)? 3  -GS     Climbing 3-5 steps with a railing? 3  -GS     To walk in hospital room? 3  -GS     AM-[facility] 6 Clicks Score (PT) 20  -GS     Highest Level of Mobility Goal 6 --> Walk 10 steps or more  -       Row Name 06/20/24 1207          Functional Assessment    Outcome Measure Options AM-[facility] 6 Clicks Daily Activity (OT)  -               User Key  (r) = Recorded By, (t) = Taken By, (c) = Cosigned By      Initials Name Provider Type     Rochelle Vasquez RN Registered Nurse    Patricia Boyd OT Occupational Therapist                    Occupational Therapy Education       Title: PT OT SLP Therapies (In Progress)       Topic: Occupational Therapy (In Progress)       Point: ADL training (Done)       Description:   Instruct learner(s) on proper safety adaptation and remediation techniques during self care or transfers.   Instruct in proper use of assistive devices.                  Learning Progress Summary             Patient Acceptance, E, VU by  at 6/20/2024 1018                         Point: Home exercise program (Not Started)       Description:   Instruct learner(s) on appropriate technique for monitoring, assisting and/or progressing therapeutic exercises/activities.                   Learner Progress:  Not documented in this visit.              Point: Precautions (Done)       Description:   Instruct learner(s) on prescribed precautions during self-care and functional transfers.                  Learning Progress Summary             Patient Acceptance, E, VU by  at 6/20/2024 1018                         Point: Body mechanics (Done)       Description:   Instruct learner(s) on proper positioning and spine alignment during self-care, functional mobility activities and/or exercises.                  Learning Progress Summary             Patient Acceptance, E, VU by  at 6/20/2024 1018                                         User Key       Initials Effective Dates Name Provider Type Discipline     06/16/21 -  Patricia Cheatham, DRU Occupational Therapist OT                  OT Recommendation and Plan  Planned Therapy Interventions (OT): activity tolerance training, adaptive equipment training, BADL retraining, occupation/activity based interventions, functional balance retraining, patient/caregiver education/training, strengthening exercise, transfer/mobility retraining  Therapy Frequency (OT): daily  Plan of Care Review  Plan of Care Reviewed With: patient, spouse  Progress: no change  Outcome Evaluation: Pt. educated on spinal precautions during ADL and functional mobility. Reviewed AE to facilitate maintaining precautions with good understanding. Will continue to progress as able. Recommend home with assist at discharge.     Time Calculation:   Evaluation Complexity (OT)  Review Occupational Profile/Medical/Therapy History Complexity: brief/low complexity  Assessment, Occupational Performance/Identification of Deficit Complexity: 1-3 performance deficits  Clinical Decision Making Complexity (OT): problem focused assessment/low complexity  Overall Complexity of Evaluation (OT): low complexity     Time Calculation- OT       Row Name 06/20/24 1200             Time Calculation- OT    OT  Start Time 1018  -LC      OT Received On 06/20/24  -      OT Goal Re-Cert Due Date 06/30/24  -         Timed Charges    30243 - OT Self Care/Mgmt Minutes 15  -LC         Untimed Charges    OT Eval/Re-eval Minutes 47  -LC         Total Minutes    Timed Charges Total Minutes 15  -LC      Untimed Charges Total Minutes 47  -LC       Total Minutes 62  -LC                User Key  (r) = Recorded By, (t) = Taken By, (c) = Cosigned By      Initials Name Provider Type     Patricia Cheatham OT Occupational Therapist                  Therapy Charges for Today       Code Description Service Date Service Provider Modifiers Qty    [id number]  OT SELF CARE/MGMT/TRAIN EA 15 MIN 6/20/2024 Patricia Cheatham OT GO 1    [id number]  OT EVAL LOW COMPLEXITY 4 6/20/2024 Patricia Cheatham OT GO 1                 Patricia Cheatham OT  6/20/2024

## 2024-06-20 NOTE — THERAPY TREATMENT NOTE
Patient Name: Bravo Rogers  : 1965    MRN: [id number]                              Today's Date: 2024       Admit Date: 2024    Visit Dx:     ICD-10-CM ICD-9-CM   1. S/P lumbar laminectomy  Z98.890 V45.89   2. Low back pain with sciatica, sciatica laterality unspecified, unspecified back pain laterality, unspecified chronicity  M54.40 724.3     Patient Active Problem List   Diagnosis    Back pain    S/P lumbar laminectomy    Hyperlipidemia    Hypertension     Past Medical History:   Diagnosis Date    Elevated cholesterol     GERD (gastroesophageal reflux disease)     Hypertension     Seasonal allergies     Spinal stenosis      Past Surgical History:   Procedure Laterality Date    COLONOSCOPY      CYSTOSCOPY      lithotripsy, stent for kidney stone    FINGER / TOE CROSS FLAP      LUMBAR LAMINECTOMY DISCECTOMY DECOMPRESSION N/A 2024    Procedure: LUMBAR LAMINECTOMY L3-4, L4-5;  Surgeon: Grant Calix MD;  Location: [facility];  Service: Orthopedic Spine;  Laterality: N/A;    VASECTOMY      WISDOM TOOTH EXTRACTION        General Information       Row Name 24 1313          Physical Therapy Time and Intention    Document Type therapy note (daily note)  -AE     Mode of Treatment physical therapy  -AE       Row Name 24 1313          General Information    Patient Profile Reviewed yes  -AE     Existing Precautions/Restrictions fall;spinal;other (see comments)  hemovac  -AE     Barriers to Rehab none identified  -AE       Row Name 24 1313          Cognition    Orientation Status (Cognition) oriented x 4  -AE       Row Name 24 1313          Safety Issues, Functional Mobility    Safety Issues Affecting Function (Mobility) awareness of need for assistance;insight into deficits/self-awareness;safety precaution awareness;sequencing abilities  -AE     Impairments Affecting Function (Mobility) balance;endurance/activity tolerance;strength  -AE               User Key  (r) = Recorded  By, (t) = Taken By, (c) = Cosigned By      Initials Name Provider Type    AE Jude Helms, PT Physical Therapist                   Mobility       Row Name 06/20/24 1314          Bed Mobility    Bed Mobility supine-sit;sit-supine  -AE     Supine-Sit Weaver (Bed Mobility) standby assist  -AE     Sit-Supine Weaver (Bed Mobility) standby assist  -AE     Assistive Device (Bed Mobility) bed rails  -AE     Comment, (Bed Mobility) Pt demo good use of log rolling technique to complete supine to sit transfer at EOB. Pt demo good adherence to spinal precautions.  -AE       Row Name 06/20/24 1314          Transfers    Comment, (Transfers) VCs for hand placement and sequencing. Good upright standing posture this session.  -AE       Row Name 06/20/24 1314          Sit-Stand Transfer    Sit-Stand Weaver (Transfers) standby assist  -AE     Assistive Device (Sit-Stand Transfers) walker, front-wheeled  -AE       Row Name 06/20/24 1314          Gait/Stairs (Locomotion)    Weaver Level (Gait) contact guard;1 person assist  -AE     Assistive Device (Gait) walker, front-wheeled  -AE     Distance in Feet (Gait) 350  -AE     Deviations/Abnormal Patterns (Gait) bilateral deviations;maryam decreased;gait speed decreased  -AE     Bilateral Gait Deviations heel strike decreased  -AE     Weaver Level (Stairs) contact guard;1 person assist  -AE     Assistive Device (Stairs) walker, front-wheeled  -AE     Number of Steps (Stairs) 1  -AE     Ascending Technique (Stairs) step-to-step  -AE     Descending Technique (Stairs) step-to-step  -AE     Comment, (Gait/Stairs) Pt demo step through gait pattern with slowed maryam and decreased gait speed. Pt required increased time and cues with stair training for improved safety awareness while navigating steps. No LOB noted this session. Further distance limited by fatigue.  -AE               User Key  (r) = Recorded By, (t) = Taken By, (c) = Cosigned By      Initials Name  Provider Type    AE Jude Helms, PT Physical Therapist                   Obj/Interventions       Row Name 06/20/24 1318          Balance    Balance Assessment sitting static balance;sitting dynamic balance;sit to stand dynamic balance;standing static balance;standing dynamic balance  -AE     Static Sitting Balance standby assist  -AE     Dynamic Sitting Balance standby assist  -AE     Position, Sitting Balance unsupported;sitting edge of bed  -AE     Sit to Stand Dynamic Balance standby assist;verbal cues  -AE     Static Standing Balance contact guard  -AE     Dynamic Standing Balance contact guard  -AE     Position/Device Used, Standing Balance supported;walker, front-wheeled  -AE               User Key  (r) = Recorded By, (t) = Taken By, (c) = Cosigned By      Initials Name Provider Type    AE Jude Helms PT Physical Therapist                   Goals/Plan    No documentation.                  Clinical Impression       Row Name 06/20/24 1319          Pain    Pain Intervention(s) Repositioned;Ambulation/increased activity  -AE     Additional Documentation Pain Scale: FACES Pre/Post-Treatment (Group)  -AE       Row Name 06/20/24 1319          Pain Scale: FACES Pre/Post-Treatment    Pain: FACES Scale, Pretreatment 2-->hurts little bit  -AE     Posttreatment Pain Rating 2-->hurts little bit  -AE     Pain Location - back  -AE     Pre/Posttreatment Pain Comment tolerated; RN managing  -AE       Row Name 06/20/24 1319          Plan of Care Review    Plan of Care Reviewed With patient;spouse  -AE     Progress improving  -AE     Outcome Evaluation Pt continues to present with decreased functional mobility and decreased activity tolerance compared to baseline. Pt ambulated 350ft with CGA and RW for support. Pt navigated 1 step with use of RW and CGA. Continue to progress per pt tolerance.  -AE       Row Name 06/20/24 1319          Vital Signs    Pre Systolic BP Rehab 102  -AE     Pre Treatment Diastolic BP 71  -AE      Pre SpO2 (%) 96  -AE     O2 Delivery Pre Treatment room air  -AE     O2 Delivery Intra Treatment room air  -AE     Post SpO2 (%) 97  -AE     O2 Delivery Post Treatment room air  -AE     Pre Patient Position Supine  -AE     Intra Patient Position Standing  -AE     Post Patient Position Supine  -AE       Row Name 06/20/24 1319          Positioning and Restraints    Pre-Treatment Position in bed  -AE     Post Treatment Position bed  -AE     In Bed notified nsg;supine;call light within reach;encouraged to call for assist;exit alarm on;with family/caregiver  -AE               User Key  (r) = Recorded By, (t) = Taken By, (c) = Cosigned By      Initials Name Provider Type    AE Jude Helms, PT Physical Therapist                   Outcome Measures       Row Name 06/20/24 1321 06/20/24 0735       How much help from another person do you currently need...    Turning from your back to your side while in flat bed without using bedrails? 3  -AE 4  -GS    Moving from lying on back to sitting on the side of a flat bed without bedrails? 3  -AE 4  -GS    Moving to and from a bed to a chair (including a wheelchair)? 3  -AE 3  -GS    Standing up from a chair using your arms (e.g., wheelchair, bedside chair)? 3  -AE 3  -GS    Climbing 3-5 steps with a railing? 3  -AE 3  -GS    To walk in hospital room? 3  -AE 3  -GS    AM-PAC 6 Clicks Score (PT) 18  -AE 20  -GS    Highest Level of Mobility Goal 6 --> Walk 10 steps or more  -AE 6 --> Walk 10 steps or more  -GS      Row Name 06/20/24 1321 06/20/24 1207       Functional Assessment    Outcome Measure Options AM-PAC 6 Clicks Basic Mobility (PT)  -AE AM-PAC 6 Clicks Daily Activity (OT)  -LC              User Key  (r) = Recorded By, (t) = Taken By, (c) = Cosigned By      Initials Name Provider Type    Rochelle Raman RN Registered Nurse    Patricia Boyd, OT Occupational Therapist    Jude Alvarado, PT Physical Therapist                                 Physical Therapy  Education       Title: PT OT SLP Therapies (In Progress)       Topic: Physical Therapy (Done)       Point: Mobility training (Done)       Learning Progress Summary             Patient Acceptance, E, VU by AE at 6/20/2024 1001    Acceptance, E,H,TB, VU by  at 6/19/2024 1654    Comment: Pt educated on spinal precautions and HEP   Family Acceptance, E,H,TB, VU by  at 6/19/2024 1654    Comment: Pt educated on spinal precautions and HEP                         Point: Home exercise program (Done)       Learning Progress Summary             Patient Acceptance, E, VU by AE at 6/20/2024 1001    Acceptance, E,H,TB, VU by  at 6/19/2024 1654    Comment: Pt educated on spinal precautions and HEP   Family Acceptance, E,H,TB, VU by  at 6/19/2024 1654    Comment: Pt educated on spinal precautions and HEP                         Point: Body mechanics (Done)       Learning Progress Summary             Patient Acceptance, E, VU by AE at 6/20/2024 1001    Acceptance, E,H,TB, VU by  at 6/19/2024 1654    Comment: Pt educated on spinal precautions and HEP   Family Acceptance, E,H,TB, VU by  at 6/19/2024 1654    Comment: Pt educated on spinal precautions and HEP                         Point: Precautions (Done)       Learning Progress Summary             Patient Acceptance, E, VU by AE at 6/20/2024 1001    Acceptance, E,H,TB, VU by  at 6/19/2024 1654    Comment: Pt educated on spinal precautions and HEP   Family Acceptance, E,H,TB, VU by  at 6/19/2024 1654    Comment: Pt educated on spinal precautions and HEP                                         User Key       Initials Effective Dates Name Provider Type Discipline    AE 09/21/21 -  Jude Helms, PT Physical Therapist PT     05/07/24 -  Patsy Parker, PT Student PT Student PT                  PT Recommendation and Plan     Plan of Care Reviewed With: patient, spouse  Progress: improving  Outcome Evaluation: Pt continues to present with decreased functional  mobility and decreased activity tolerance compared to baseline. Pt ambulated 350ft with CGA and RW for support. Pt navigated 1 step with use of RW and CGA. Continue to progress per pt tolerance.     Time Calculation:         PT Charges       Row Name 06/20/24 1322             Time Calculation    Start Time 1001  -AE      PT Received On 06/20/24  -AE      PT Goal Re-Cert Due Date 06/29/24  -AE         Timed Charges    23682 - Gait Training Minutes  23  -AE         Total Minutes    Timed Charges Total Minutes 23  -AE       Total Minutes 23  -AE                User Key  (r) = Recorded By, (t) = Taken By, (c) = Cosigned By      Initials Name Provider Type    AE Jude Helms, PT Physical Therapist                  Therapy Charges for Today       Code Description Service Date Service Provider Modifiers Qty    [id number] HC GAIT TRAINING EA 15 MIN 6/20/2024 Jude Helms PT GP 2            PT G-Codes  Outcome Measure Options: AM-PAC 6 Clicks Basic Mobility (PT)  AM-PAC 6 Clicks Score (PT): 18  AM-PAC 6 Clicks Score (OT): 20  PT Discharge Summary  Anticipated Discharge Disposition (PT): home with assist    Jude Helms PT  6/20/2024

## 2024-06-20 NOTE — PLAN OF CARE
Goal Outcome Evaluation:  Plan of Care Reviewed With: patient        Progress: no change       Pt is alert and oriented X 4. VSS. RA. Pain controlled with prn and schedule pain meds. Ambulated with assist X 1, walker and GB to the bathroom. Voiding well. 60 ml bloody drainage from hemovac. Dressing CDI.

## 2024-06-20 NOTE — PLAN OF CARE
Goal Outcome Evaluation:  Plan of Care Reviewed With: patient, spouse        Progress: improving  Outcome Evaluation: Pt continues to present with decreased functional mobility and decreased activity tolerance compared to baseline. Pt ambulated 350ft with CGA and RW for support. Pt navigated 1 step with use of RW and CGA. Continue to progress per pt tolerance.      Anticipated Discharge Disposition (PT): home with assist

## 2024-06-20 NOTE — PLAN OF CARE
Goal Outcome Evaluation:  Plan of Care Reviewed With: patient, spouse        Progress: no change  Outcome Evaluation: Pt. educated on spinal precautions during ADL and functional mobility. Reviewed AE to facilitate maintaining precautions with good understanding. Will continue to progress as able. Recommend home with assist at discharge.      Anticipated Discharge Disposition (OT): home with assist

## (undated) DEVICE — DIFFUSER: Brand: CORE, MAESTRO

## (undated) DEVICE — SPONGE,DISSECTOR,K,XRAY,9/16"X1/4",STRL: Brand: MEDLINE

## (undated) DEVICE — DRAPE,REIN 53X77,STERILE: Brand: MEDLINE

## (undated) DEVICE — SPNG GZ WOVN 4X4IN 12PLY 10/BX STRL

## (undated) DEVICE — ANTIBACTERIAL VIOLET BRAIDED (POLYGLACTIN 910), SYNTHETIC ABSORBABLE SUTURE: Brand: COATED VICRYL

## (undated) DEVICE — PATIENT RETURN ELECTRODE, SINGLE-USE, CONTACT QUALITY MONITORING, ADULT, WITH 9FT CORD, FOR PATIENTS WEIGING OVER 33LBS. (15KG): Brand: MEGADYNE

## (undated) DEVICE — PAD,ARMBOARD,CONV,FOAM,2X8X20",12PR/CS: Brand: MEDLINE

## (undated) DEVICE — 3M™ MEDIPORE™ H SOFT CLOTH SURGICAL TAPE, 2863, 3 IN X 10 YD, 12/CASE: Brand: 3M™ MEDIPORE™

## (undated) DEVICE — HDRST INTUB GENTLETOUCH SLOT 7IN RT

## (undated) DEVICE — HYPODERMIC SAFETY NEEDLE: Brand: MONOJECT

## (undated) DEVICE — GOWN,SIRUS,POLYRNF,XLN/3XL,18/CS: Brand: MEDLINE

## (undated) DEVICE — PENCL SMOKE/EVAC MEGADYNE TELESCP 10FT

## (undated) DEVICE — POSTN ARM CRDL LAMIN PK/2

## (undated) DEVICE — TRAP FLD MINIVAC MEGADYNE 100ML

## (undated) DEVICE — BLANKT WARM UPPR/BDY ARM/OUT 57X196CM

## (undated) DEVICE — CAUTERY TIP POLISHER: Brand: DEVON

## (undated) DEVICE — OIL CARTRIDGE: Brand: CORE, MAESTRO

## (undated) DEVICE — CLN MEGADYNE TP SS

## (undated) DEVICE — PK SPINE ORTHO 10

## (undated) DEVICE — ELECTRD BLD EZ CLN STD 4IN

## (undated) DEVICE — ANTIBACTERIAL UNDYED BRAIDED (POLYGLACTIN 910), SYNTHETIC ABSORBABLE SUTURE: Brand: COATED VICRYL

## (undated) DEVICE — BANDAGE,GAUZE,BULKEE II,4.5"X4.1YD,STRL: Brand: MEDLINE

## (undated) DEVICE — GLV SURG SENSICARE PI ORTHO SZ9 LF STRL

## (undated) DEVICE — 450 ML BOTTLE OF 0.05% CHLORHEXIDINE GLUCONATE IN 99.95% STERILE WATER FOR IRRIGATION, USP AND APPLICATOR.: Brand: IRRISEPT ANTIMICROBIAL WOUND LAVAGE